# Patient Record
Sex: FEMALE | Race: WHITE | NOT HISPANIC OR LATINO | Employment: UNEMPLOYED | URBAN - METROPOLITAN AREA
[De-identification: names, ages, dates, MRNs, and addresses within clinical notes are randomized per-mention and may not be internally consistent; named-entity substitution may affect disease eponyms.]

---

## 2023-09-18 LAB
ERYTHROCYTE DISTRIBUTION WIDTH (RATIO) BY AUTOMATED COUNT: 13.4 % (ref 11.5–14.5)
ERYTHROCYTE MEAN CORPUSCULAR HEMOGLOBIN CONCENTRATION (G/DL) BY AUTOMATED: 32.3 G/DL (ref 32–36)
ERYTHROCYTE MEAN CORPUSCULAR VOLUME (FL) BY AUTOMATED COUNT: 86 FL (ref 80–100)
ERYTHROCYTES (10*6/UL) IN BLOOD BY AUTOMATED COUNT: 4.66 X10E12/L (ref 4–5.2)
HEMATOCRIT (%) IN BLOOD BY AUTOMATED COUNT: 40.2 % (ref 36–46)
HEMOGLOBIN (G/DL) IN BLOOD: 13 G/DL (ref 12–16)
LEUKOCYTES (10*3/UL) IN BLOOD BY AUTOMATED COUNT: 11.2 X10E9/L (ref 4.4–11.3)
PLATELETS (10*3/UL) IN BLOOD AUTOMATED COUNT: 261 X10E9/L (ref 150–450)
REFLEX ADDED, ANEMIA PANEL: NORMAL

## 2023-09-19 LAB
ABO GROUP (TYPE) IN BLOOD: NORMAL
ANTIBODY SCREEN: NORMAL
HEPATITIS B VIRUS SURFACE AG PRESENCE IN SERUM: NONREACTIVE
HEPATITIS C VIRUS AB PRESENCE IN SERUM: NONREACTIVE
HIV 1/ 2 AG/AB SCREEN: NONREACTIVE
RH FACTOR: NORMAL
RUBELLA VIRUS IGG AB: POSITIVE
SYPHILIS TOTAL AB: NONREACTIVE

## 2023-10-03 PROBLEM — J32.9 CHRONIC SINUSITIS: Status: ACTIVE | Noted: 2023-10-03

## 2023-10-03 PROBLEM — N92.6 MISSED PERIOD: Status: ACTIVE | Noted: 2023-10-03

## 2023-10-03 PROBLEM — R10.2 PELVIC AND PERINEAL PAIN: Status: ACTIVE | Noted: 2023-10-03

## 2023-10-03 PROBLEM — Z87.42 HISTORY OF OVARIAN CYST: Status: ACTIVE | Noted: 2023-10-03

## 2023-10-03 PROBLEM — G43.909 MIGRAINE HEADACHE: Status: ACTIVE | Noted: 2023-10-03

## 2023-10-03 PROBLEM — Z34.92 SECOND TRIMESTER PREGNANCY (HHS-HCC): Status: ACTIVE | Noted: 2023-10-03

## 2023-10-03 RX ORDER — ACETAMINOPHEN 325 MG/1
3 TABLET ORAL EVERY 6 HOURS
Status: ON HOLD | COMMUNITY
Start: 2022-07-07 | End: 2024-04-23 | Stop reason: ALTCHOICE

## 2023-10-03 RX ORDER — IBUPROFEN 600 MG/1
600 TABLET ORAL EVERY 6 HOURS
COMMUNITY
Start: 2022-07-07 | End: 2023-10-05 | Stop reason: WASHOUT

## 2023-10-05 ENCOUNTER — INITIAL PRENATAL (OUTPATIENT)
Dept: OBSTETRICS AND GYNECOLOGY | Facility: CLINIC | Age: 28
End: 2023-10-05
Payer: COMMERCIAL

## 2023-10-05 VITALS — BODY MASS INDEX: 21.9 KG/M2 | DIASTOLIC BLOOD PRESSURE: 60 MMHG | WEIGHT: 144 LBS | SYSTOLIC BLOOD PRESSURE: 106 MMHG

## 2023-10-05 DIAGNOSIS — Z11.3 ROUTINE SCREENING FOR STI (SEXUALLY TRANSMITTED INFECTION): ICD-10-CM

## 2023-10-05 DIAGNOSIS — O21.9 NAUSEA AND VOMITING IN PREGNANCY PRIOR TO 22 WEEKS GESTATION (HHS-HCC): ICD-10-CM

## 2023-10-05 DIAGNOSIS — Z98.891 HISTORY OF CESAREAN SECTION: ICD-10-CM

## 2023-10-05 DIAGNOSIS — O46.8X1 SUBCHORIONIC HEMORRHAGE OF PLACENTA IN FIRST TRIMESTER, FETUS 1 OF MULTIPLE GESTATION: ICD-10-CM

## 2023-10-05 DIAGNOSIS — O41.8X11 SUBCHORIONIC HEMORRHAGE OF PLACENTA IN FIRST TRIMESTER, FETUS 1 OF MULTIPLE GESTATION: ICD-10-CM

## 2023-10-05 DIAGNOSIS — O09.299 HISTORY OF MACROSOMIA IN INFANT IN PRIOR PREGNANCY, CURRENTLY PREGNANT (HHS-HCC): ICD-10-CM

## 2023-10-05 DIAGNOSIS — Z34.81 PRENATAL CARE, SUBSEQUENT PREGNANCY, FIRST TRIMESTER (HHS-HCC): Primary | ICD-10-CM

## 2023-10-05 DIAGNOSIS — Z12.4 CERVICAL CANCER SCREENING: ICD-10-CM

## 2023-10-05 PROCEDURE — 0500F INITIAL PRENATAL CARE VISIT: CPT | Performed by: OBSTETRICS & GYNECOLOGY

## 2023-10-05 PROCEDURE — 87624 HPV HI-RISK TYP POOLED RSLT: CPT

## 2023-10-05 PROCEDURE — 88175 CYTOPATH C/V AUTO FLUID REDO: CPT

## 2023-10-05 PROCEDURE — 87086 URINE CULTURE/COLONY COUNT: CPT | Mod: CMCLAB,GEALAB | Performed by: OBSTETRICS & GYNECOLOGY

## 2023-10-05 PROCEDURE — 87800 DETECT AGNT MULT DNA DIREC: CPT

## 2023-10-05 PROCEDURE — 88141 CYTOPATH C/V INTERPRET: CPT | Performed by: PATHOLOGY

## 2023-10-05 NOTE — PROGRESS NOTES
Subjective   Patient ID [id number]   Che Mckeon is a 28 y.o.  at 10w5d with a working estimated date of delivery of 2024, by Last Menstrual Period who presents for an initial prenatal visit. This pregnancy is planned.    Her pregnancy is complicated by:  History LTCS  Subchorionic bleed  History LGA infant  Nausea/vomiting    OB History    Para Term  AB Living   2 1 1 0 0 1   SAB IAB Ectopic Multiple Live Births   0 0 0 0 1      # Outcome Date GA Lbr Yuri/2nd Weight Sex Delivery Anes PTL Lv   2 Current            1 Term 22   4082 g F CS-Unspec Spinal N MICHAEL      Complications: Breech birth          Objective   Physical Exam  Weight: 65.3 kg (144 lb)  Expected Total Weight Gain: Could not be calculated   Pregravid BMI: Could not be calculated  BP: 106/60    Fetal Heart Rate: +     Physical Exam  Constitutional:       Appearance: Normal appearance.   Genitourinary:      Genitourinary Comments: Vulva normal in appearance without discoloration, rashes, lesions. Vagina is negative for blood, discharge, lesions. Uterus is small, mobile, non tender. There is no cervical motion tenderness, adnexal masses/tenderness.    Cardiovascular:      Rate and Rhythm: Normal rate and regular rhythm.   Pulmonary:      Effort: Pulmonary effort is normal.      Breath sounds: Normal breath sounds.   Abdominal:      General: Abdomen is flat.      Palpations: Abdomen is soft.      Tenderness: There is no abdominal tenderness.   Musculoskeletal:         General: Normal range of motion.      Cervical back: Normal range of motion.   Neurological:      General: No focal deficit present.   Skin:     General: Skin is warm and dry.   Psychiatric:         Mood and Affect: Mood normal.   Vitals and nursing note reviewed.         Prenatal Labs  PNL wnl    Assessment/Plan   Problem List Items Addressed This Visit    None  Visit Diagnoses         Codes    Prenatal care, subsequent pregnancy, first trimester    -  Primary  Z34.81    PNL reviewed/wnl  PNV  Advised TDap, Flu, COVID, RSV vaccines     Relevant Orders    THINPREP PAP    Urine Culture    History of  section     Z98.891    MFMU score calculated and 85% likelihood of success  Discussed options including TOLAC/RLTCS and risks/benefits of each     History of macrosomia in infant in prior pregnancy, currently pregnant     O09.299    G1 was 9 lb infant  Consider growth US at 36 weeks     Subchorionic hemorrhage of placenta in first trimester, fetus 1 of multiple gestation     O41.8X11, O46.8X1    Noted at [facility]. Patient Rh+, Rhogam not indicated  Precautions given     Nausea and vomiting in pregnancy prior to 22 weeks gestation     O21.9    Mild in first trimester. Discussed Diclegis/Bonjesta. Patient considering     Cervical cancer screening     Z12.4    Pap done at [facility]     Routine screening for STI (sexually transmitted infection)     Z11.3    NG/CT done at [facility]             Immunizations: Discussed Tdap, RSV, COVID, Flu  Prenatal Labs wnl  Daily prenatal vitamins prescribed  First trimester screening and second trimester screening discussed. Patient decided to hold at this time  Follow up in 4 weeks for return OB visit.  Kerrie Marshall DO

## 2023-10-05 NOTE — PATIENT INSTRUCTIONS
Welcome to prenatal care with [facility]!  You were seen in the office today for your initiation to prenatal care and had normal findings on exam  Continue routine OB precautions at home  Your labs were reviewed today and were normal. Routine pelvic cultures, urine culture, and pap smear (if you were due) were done today and you will be notified of the results  If you have had genetic screening labs done, we usually receive the results from [facility] within 7-9 business days and we will call you with the results.   Continue taking prenatal vitamins   Avoid sick contacts and consider getting your Flu (available in office during flu season) and COVID vaccines to protect against infection in pregnancy    What to expect:  -    You will see each of our physicians at least once during your prenatal care. There are 5 physicians (Kevin Sebastian, Blade, Aniket, Vale, and Dawit) and our NP Magnolia Cole. We rotate OB call to be able to provide the best care to our patients during this important time, and we want to make sure you have had a chance to meet each physician prior to coming in for labor.   -    We will see you in the office every 4 weeks in the first two trimesters, every 2 weeks between 30 and 36 weeks gestation, and weekly following 36 weeks. Anatomy ultrasounds are done at [facility] around 20 weeks, gestational diabetes and anemia screening is done through outpatient labs between 25 and 28 weeks gestation, and labor and delivery preparations (ultrasound to confirm presentation, consent forms, etc) are done at 36 weeks in the office.   -    Visits can seem quick, but provide us with quite a bit of important information. So it is important to try not to miss any visits if possible and make up any cancelled appointments as soon as possible. Make sure you come to each visit with a full bladder because urine testing for bacteria, glucose, and protein are done at each visit. And although the OB visits may seem quick,  we are happy to take the time to answer any questions or discuss any concerns you may have  -    We deliver at [facility]: [address] [place], [address]  -    Birth, lactation, and parenting classes, as well as scheduling for hospital tours can be done through [email or web address].       Make an appointment for routine care in the office in the next 4 weeks  If you are having any bleeding, pain, severe nausea and/or vomiting, or any other concerns prior to your next visit, please call the office to speak to the physician on call. This includes after hours, weekends, and holidays, when the answering service will be able to connect you with the physician on call. [phone] ([place] Office) or [phone] ([place] Office).    Congratulations, we are excited to partner with you in the care of your pregnancy!

## 2023-10-07 LAB — BACTERIA UR CULT: NORMAL

## 2023-10-10 LAB
C TRACH RRNA SPEC QL NAA+PROBE: NEGATIVE
N GONORRHOEA DNA SPEC QL PROBE+SIG AMP: NEGATIVE

## 2023-10-27 LAB
CYTOLOGY CMNT CVX/VAG CYTO-IMP: NORMAL
HPV HR GENOTYPES PNL CVX NAA+PROBE: NEGATIVE
HPV HR GENOTYPES PNL CVX NAA+PROBE: NEGATIVE
HPV16 DNA SPEC QL NAA+PROBE: NEGATIVE
HPV18 DNA SPEC QL NAA+PROBE: NEGATIVE
LAB AP HPV GENOTYPE QUESTION: YES
LAB AP HPV HR: NORMAL
LAB AP PAP ADDITIONAL TESTS: NORMAL
LABORATORY COMMENT REPORT: NORMAL
LMP START DATE: NORMAL
MENSTRUAL HX REPORTED: NORMAL
PATH REPORT.TOTAL CANCER: NORMAL

## 2023-11-02 ENCOUNTER — ROUTINE PRENATAL (OUTPATIENT)
Dept: OBSTETRICS AND GYNECOLOGY | Facility: CLINIC | Age: 28
End: 2023-11-02
Payer: COMMERCIAL

## 2023-11-02 VITALS — WEIGHT: 153 LBS | BODY MASS INDEX: 23.26 KG/M2 | DIASTOLIC BLOOD PRESSURE: 76 MMHG | SYSTOLIC BLOOD PRESSURE: 128 MMHG

## 2023-11-02 DIAGNOSIS — Z34.82 ENCOUNTER FOR SUPERVISION OF OTHER NORMAL PREGNANCY IN SECOND TRIMESTER (HHS-HCC): Primary | ICD-10-CM

## 2023-11-02 DIAGNOSIS — O35.9XX1 SUSPECTED FETAL ANOMALY, ANTEPARTUM, FETUS 1 OF MULTIPLE GESTATION (HHS-HCC): ICD-10-CM

## 2023-11-02 LAB
POC APPEARANCE, URINE: CLEAR
POC BILIRUBIN, URINE: NEGATIVE
POC BLOOD, URINE: NEGATIVE
POC COLOR, URINE: YELLOW
POC GLUCOSE, URINE: NEGATIVE MG/DL
POC KETONES, URINE: NEGATIVE MG/DL
POC LEUKOCYTES, URINE: NEGATIVE
POC NITRITE,URINE: NEGATIVE
POC PH, URINE: 6 PH
POC PROTEIN, URINE: NEGATIVE MG/DL
POC SPECIFIC GRAVITY, URINE: 1.01
POC UROBILINOGEN, URINE: 0.2 EU/DL

## 2023-11-02 PROCEDURE — 81003 URINALYSIS AUTO W/O SCOPE: CPT | Performed by: OBSTETRICS & GYNECOLOGY

## 2023-11-02 PROCEDURE — 0501F PRENATAL FLOW SHEET: CPT | Performed by: OBSTETRICS & GYNECOLOGY

## 2023-11-02 NOTE — PROGRESS NOTES
Subjective   Che Mckeon is a 28 y.o.  at 14w5d, presents for a routine prenatal visit.     Objective     /76   Wt 69.4 kg (153 lb)   LMP 2023 (Exact Date)   BMI 23.26 kg/m²     Informal transabd ultrasound shows viable single IUP.   + FCA.     Plan    14 wks   Declines flu vaccine.  Anatomy ultrasound requisition.

## 2023-11-29 NOTE — PROGRESS NOTES
Subjective   Patient ID [id number]   Che Mckeon is a 28 y.o.  at 18w4d ,+FM. Has anatomy scan scheduled Friday the .    Objective   Physical Exam  Weight: 70.8 kg (156 lb)  BP: 114/60  Fetal Heart Rate: 150 Fundal Height (cm): 18 cm    Lab Results   Component Value Date    HGB 13.0 2023    HCT 40.2 2023       Assessment/Plan   Anatomy scan on the .  Declines Flu/Covid vaccines.  Follow up in 4 weeks for a routine prenatal visit.

## 2023-11-30 ENCOUNTER — ROUTINE PRENATAL (OUTPATIENT)
Dept: OBSTETRICS AND GYNECOLOGY | Facility: CLINIC | Age: 28
End: 2023-11-30
Payer: COMMERCIAL

## 2023-11-30 VITALS — BODY MASS INDEX: 23.72 KG/M2 | WEIGHT: 156 LBS | SYSTOLIC BLOOD PRESSURE: 114 MMHG | DIASTOLIC BLOOD PRESSURE: 60 MMHG

## 2023-11-30 DIAGNOSIS — Z3A.18 18 WEEKS GESTATION OF PREGNANCY (HHS-HCC): ICD-10-CM

## 2023-11-30 LAB
POC GLUCOSE, URINE: NEGATIVE MG/DL
POC PROTEIN, URINE: ABNORMAL MG/DL

## 2023-11-30 PROCEDURE — 0501F PRENATAL FLOW SHEET: CPT | Performed by: OBSTETRICS & GYNECOLOGY

## 2023-12-08 ENCOUNTER — ANCILLARY PROCEDURE (OUTPATIENT)
Dept: RADIOLOGY | Facility: CLINIC | Age: 28
End: 2023-12-08
Payer: COMMERCIAL

## 2023-12-08 DIAGNOSIS — Z34.90 PREGNANT (HHS-HCC): ICD-10-CM

## 2023-12-08 PROCEDURE — 76805 OB US >/= 14 WKS SNGL FETUS: CPT | Performed by: OBSTETRICS & GYNECOLOGY

## 2023-12-08 PROCEDURE — 76805 OB US >/= 14 WKS SNGL FETUS: CPT

## 2023-12-29 ENCOUNTER — APPOINTMENT (OUTPATIENT)
Dept: OBSTETRICS AND GYNECOLOGY | Facility: CLINIC | Age: 28
End: 2023-12-29
Payer: COMMERCIAL

## 2024-01-05 ENCOUNTER — APPOINTMENT (OUTPATIENT)
Dept: OBSTETRICS AND GYNECOLOGY | Facility: CLINIC | Age: 29
End: 2024-01-05
Payer: COMMERCIAL

## 2024-01-17 NOTE — PROGRESS NOTES
Subjective   Patient ID [id number]   Che Mckeon is a 28 y.o.  at 25w5d +FM. Last seen  had to reschedule and was sick. Having pressure, no ctxs.    Objective   Physical Exam  Weight: 75.3 kg (166 lb)  BP: 104/60  Fetal Heart Rate: 140 Fundal Height (cm): 26 cm    Lab Results   Component Value Date    HGB 13.0 2023    HCT 40.2 2023       Assessment/Plan   Glucola given Will do in the next couple weeks.  Follow up in 4 weeks for a routine prenatal visit.

## 2024-01-18 ENCOUNTER — ROUTINE PRENATAL (OUTPATIENT)
Dept: OBSTETRICS AND GYNECOLOGY | Facility: CLINIC | Age: 29
End: 2024-01-18
Payer: COMMERCIAL

## 2024-01-18 VITALS — SYSTOLIC BLOOD PRESSURE: 104 MMHG | WEIGHT: 166 LBS | DIASTOLIC BLOOD PRESSURE: 60 MMHG | BODY MASS INDEX: 25.24 KG/M2

## 2024-01-18 DIAGNOSIS — Z3A.25 25 WEEKS GESTATION OF PREGNANCY (HHS-HCC): ICD-10-CM

## 2024-01-18 LAB
POC GLUCOSE, URINE: NEGATIVE MG/DL
POC PROTEIN, URINE: NEGATIVE MG/DL

## 2024-01-18 PROCEDURE — 0501F PRENATAL FLOW SHEET: CPT | Performed by: OBSTETRICS & GYNECOLOGY

## 2024-01-18 PROCEDURE — 81003 URINALYSIS AUTO W/O SCOPE: CPT | Performed by: OBSTETRICS & GYNECOLOGY

## 2024-01-18 NOTE — PATIENT INSTRUCTIONS
You were seen in the office today for routine OB care   Continue routine OB precautions at home  Continue taking prenatal vitamins   Avoid sick contacts and consider getting your Flu (available in office during flu season) and COVID vaccines to protect against infection in pregnancy  You will be given an order for your anatomy ultrasound. Please schedule at [facility] between 19 and 22 weeks gestation [phone]  You will be given a bottle of Glucola and orders for your second trimester labs. Please complete these between 25 and 28 weeks gestation. You may complete these at any  outpatient lab, and do not need an appointment  Make an appointment for routine care in the office in the next 4 weeks  If you are having any concerns prior to your next visit please call the office to speak to the physician on call. This includes after hours, weekends, and holidays, when the answering service will be able to connect you with the physician on call. [phone] ([place] Office) or [phone] [place] [facility].

## 2024-01-30 ENCOUNTER — LAB (OUTPATIENT)
Dept: LAB | Facility: LAB | Age: 29
End: 2024-01-30
Payer: COMMERCIAL

## 2024-01-30 DIAGNOSIS — Z3A.25 25 WEEKS GESTATION OF PREGNANCY (HHS-HCC): ICD-10-CM

## 2024-01-30 LAB
ERYTHROCYTE [DISTWIDTH] IN BLOOD BY AUTOMATED COUNT: 13 % (ref 11.5–14.5)
GLUCOSE 1H P 50 G GLC PO SERPL-MCNC: 108 MG/DL
HCT VFR BLD AUTO: 36.5 % (ref 36–46)
HGB BLD-MCNC: 11.7 G/DL (ref 12–16)
MCH RBC QN AUTO: 26.9 PG (ref 26–34)
MCHC RBC AUTO-ENTMCNC: 32.1 G/DL (ref 32–36)
MCV RBC AUTO: 84 FL (ref 80–100)
NRBC BLD-RTO: 0 /100 WBCS (ref 0–0)
PLATELET # BLD AUTO: 255 X10*3/UL (ref 150–450)
RBC # BLD AUTO: 4.35 X10*6/UL (ref 4–5.2)
WBC # BLD AUTO: 8.2 X10*3/UL (ref 4.4–11.3)

## 2024-01-30 PROCEDURE — 36415 COLL VENOUS BLD VENIPUNCTURE: CPT

## 2024-01-31 LAB — REFLEX ADDED, ANEMIA PANEL: NORMAL

## 2024-02-15 ENCOUNTER — APPOINTMENT (OUTPATIENT)
Dept: OBSTETRICS AND GYNECOLOGY | Facility: CLINIC | Age: 29
End: 2024-02-15
Payer: COMMERCIAL

## 2024-02-15 ENCOUNTER — ROUTINE PRENATAL (OUTPATIENT)
Dept: OBSTETRICS AND GYNECOLOGY | Facility: CLINIC | Age: 29
End: 2024-02-15
Payer: COMMERCIAL

## 2024-02-15 VITALS — SYSTOLIC BLOOD PRESSURE: 110 MMHG | WEIGHT: 170 LBS | DIASTOLIC BLOOD PRESSURE: 60 MMHG | BODY MASS INDEX: 25.85 KG/M2

## 2024-02-15 DIAGNOSIS — Z3A.29 29 WEEKS GESTATION OF PREGNANCY (HHS-HCC): ICD-10-CM

## 2024-02-15 DIAGNOSIS — Z34.83 MULTIGRAVIDA IN THIRD TRIMESTER (HHS-HCC): Primary | ICD-10-CM

## 2024-02-15 LAB
POC GLUCOSE, URINE: NEGATIVE MG/DL
POC PROTEIN, URINE: NEGATIVE MG/DL

## 2024-02-15 PROCEDURE — 0501F PRENATAL FLOW SHEET: CPT | Performed by: OBSTETRICS & GYNECOLOGY

## 2024-02-15 NOTE — PATIENT INSTRUCTIONS
You were seen in the office today for routine OB care and had normal findings on exam  Continue routine OB precautions at home  Continue taking prenatal vitamins   Avoid sick contacts and consider getting your Flu (available in office during flu season) and COVID vaccines to protect against infection in pregnancy  We recommend getting the Tdap (available in office) and RSV vaccines to pass some immunity from mother to baby and protect your baby against RSV and Whooping cough in the first few months of life. Tdap can be given between 27 and 36 weeks, and RSV vaccinations can be given between 32 and 36 weeks gestation  Make an appointment for routine care in the office in the next 2 weeks  If you are having any painful contractions, vaginal bleeding, leaking amniotic fluid, or decrease in baby's movements prior to your next visit please call the office to speak to the physician on call. This includes after hours, weekends, and holidays, when the answering service will be able to connect you with the physician on call. [phone] ([place] Office) or [phone] ([place] Office).    RSV Vaccination in Pregnancy:  ACOG recommends the Pfizer RSV vaccine if you are 32 to 36 weeks pregnant from September to January. The vaccines creates  antibodies (immune proteins) that pass from you to your fetus. This means the baby will have some antibodies to protect them from RSV for the first 6 months after birth  RSV, or respiratory syncytial virus is a virus that spreads in the fall and winter. RSV can be dangerous for babies and young children. It is the leading cause of hospitalization among infants in the [place].  There are multiple RSV vaccines approved by the U.S. Food and Drug Administration (FDA). The only RSV vaccine approved by the FDA for use in pregnancy is the one made by Pfizer. It is called Abrysvo.  You can get the Pfizer RSV vaccine at the same time as other vaccines recommended in pregnancy (COVID,  Flu, Tdap). Common side effects of the RSV vaccine include arm pain, headache, muscle pain, and nausea, similar to other vaccines side effects. Side effects are normal and not a cause for concern.   The RSV vaccine is one of two options for protecting babies during RSV season. There is also an option to give babies an injection called nirsevimab. Nirsevimab contains lab-made antibodies that protect against RSV. It is not a vaccine.   In most cases, you should choose between the RSV vaccine during pregnancy nirsevimab after birth (not both). The goal is to protect your baby from RSV, either with antibodies made during pregnancy or with antibodies given directly to your baby after birth.  Benefits to getting the RSV vaccine during pregnancy include: RSV vaccine in pregnancy gives your baby protection right after birth, decreasing the number of injections given to your baby after birth, potential difficulty getting nirsevimab in he fall/winter due to less availability during busy vaccination seasons. Benefits to nirsevimab include possible longer lasting protection.   We fully support the recommendations by ACOG regarding the RSV vaccine in pregnancy. We are unable to offer this vaccine in the office at this time. You should be able to get this vaccine through your primary care physician, pharmacies, and minute clinics. Please make sure you are receiving the Pfizer vaccine, as it is the only version to be FDA approved for use in pregnancy. Please let us know if you have had this vaccine so we can include it in your chart.     Tdap Vaccination in Pregnancy:  Pertussis, also known as whooping cough, is a highly contagious disease that causes severe coughing and difficulty breathing. Although pertussis can affect people at any age, infants younger than age 3 months have the highest risk of severe disease and dying.  The Tdap vaccine is used to prevent tetanus, diphtheria, and pertussis. It is recommended all pregnant  women should get a Tdap vaccine between 27 and 36 weeks of pregnancy, as early in the window as possible. It is recommended that the Tdap vaccine be given in every pregnancy, so you should get the Tdap during the recommended window, even if you received the vaccine in a previous pregnancy  The Tdap vaccine helps the human body make protective antibodies against pertussis. These antibodies are then passed through the placenta from mother to baby, and help protect your baby from pertussis until they are eligible to start being vaccinated at 2 months of age.  Getting the Tdap vaccine during pregnancy is the most important step in early prevention against pertussis. It is also important for all family members and caregivers are up to date with their vaccines  The Tdap vaccine can also be given safely to breastfeeding women if they did not receive the vaccine during pregnancy. In addition to preventing you from being infected with pertussis and potentially passing to your baby, there may be some added immunity benefit to the baby   If you received the Tdap vaccine in your pregnancy prior to 27 to 36 weeks gestation, you do not need to be vaccinated a second time in the same pregnancy  The Tdap vaccine can be given at the same time as other recommended vaccines including the COVID, Flu vaccine, and RSV vaccines  We encourage all of our pregnant patients to get the Tdap vaccine. The Tdap vaccine is available in the office. If you would like to be given the Tdap vaccine at your visit, please let the medical assistant know.

## 2024-02-15 NOTE — PROGRESS NOTES
Subjective   Patient ID [id number]   Che Mckeon is a 28 y.o.  at 29w5d with a working estimated date of delivery of 2024, by Last Menstrual Period who presents for a routine prenatal visit. She denies vaginal bleeding, leakage of fluid, decreased fetal movements, or contractions.    Her pregnancy is complicated by:  Uncomplicated      Objective   Physical Exam:   Weight: 77.1 kg (170 lb)  Expected Total Weight Gain: Could not be calculated   Pregravid BMI: Could not be calculated  BP: 110/60                  Prenatal Labs  Urine Dip:  Lab Results   Component Value Date    KETONESU NEGATIVE 2023     Lab Results   Component Value Date    HGB 11.7 (L) 2024    HCT 36.5 2024    ABO B 2023    HEPBSAG NONREACTIVE 2023           Imagin weeks for presentation    Assessment/Plan   Problem List Items Addressed This Visit    None  Visit Diagnoses         Codes    Multigravida in third trimester    -  Primary Z34.83    Patient doing well  Advised RSV/TDap   Precautions given  RTO 2 weeks     29 weeks gestation of pregnancy     Z3A.29    Relevant Orders    POCT UA Automated manually resulted (Completed)          Continue prenatal vitamin.  Labs reviewed.  GBS 36 weeks   Expected mode of delivery desires TOLAC  Follow up in 1 week for a routine prenatal visit.  Kerrie Marshall,

## 2024-03-06 ENCOUNTER — ROUTINE PRENATAL (OUTPATIENT)
Dept: OBSTETRICS AND GYNECOLOGY | Facility: CLINIC | Age: 29
End: 2024-03-06
Payer: COMMERCIAL

## 2024-03-06 VITALS — SYSTOLIC BLOOD PRESSURE: 120 MMHG | BODY MASS INDEX: 26.61 KG/M2 | DIASTOLIC BLOOD PRESSURE: 60 MMHG | WEIGHT: 175 LBS

## 2024-03-06 DIAGNOSIS — Z3A.32 32 WEEKS GESTATION OF PREGNANCY (HHS-HCC): ICD-10-CM

## 2024-03-06 DIAGNOSIS — Z34.83 PRENATAL CARE, SUBSEQUENT PREGNANCY, THIRD TRIMESTER (HHS-HCC): Primary | ICD-10-CM

## 2024-03-06 LAB
POC GLUCOSE, URINE: NEGATIVE MG/DL
POC PROTEIN, URINE: NEGATIVE MG/DL

## 2024-03-06 PROCEDURE — 0501F PRENATAL FLOW SHEET: CPT | Performed by: OBSTETRICS & GYNECOLOGY

## 2024-03-06 NOTE — PROGRESS NOTES
Prenatal Visit    Patient presents for routine prenatal visit.   Feeling well.  Baby is moving. Occasional tightening. No bleeding. No leaking fluid.    Objective   Physical Exam:   Weight: 79.4 kg (175 lb)  Expected Total Weight Gain: Could not be calculated   Pregravid BMI: Could not be calculated  BP: 120/60  Fetal Heart Rate: 130 Fundal Height (cm): 32 cm             Assessment/Plan   1. 32 weeks gestation of pregnancy  - POCT UA Automated manually resulted  2. Prenatal care, subsequent pregnancy, third trimester    Doing well today, normal exam.  Continue prenatal vitamins.  Advised her to call for any concerns.  Follow up in 2 weeks.

## 2024-03-20 ENCOUNTER — ROUTINE PRENATAL (OUTPATIENT)
Dept: OBSTETRICS AND GYNECOLOGY | Facility: CLINIC | Age: 29
End: 2024-03-20
Payer: COMMERCIAL

## 2024-03-20 VITALS — SYSTOLIC BLOOD PRESSURE: 100 MMHG | DIASTOLIC BLOOD PRESSURE: 62 MMHG | BODY MASS INDEX: 26.76 KG/M2 | WEIGHT: 176 LBS

## 2024-03-20 DIAGNOSIS — Z34.83 PRENATAL CARE, SUBSEQUENT PREGNANCY, THIRD TRIMESTER (HHS-HCC): Primary | ICD-10-CM

## 2024-03-20 DIAGNOSIS — Z3A.34 34 WEEKS GESTATION OF PREGNANCY (HHS-HCC): ICD-10-CM

## 2024-03-20 LAB
POC APPEARANCE, URINE: CLEAR
POC BILIRUBIN, URINE: NEGATIVE
POC BLOOD, URINE: NEGATIVE
POC COLOR, URINE: YELLOW
POC GLUCOSE, URINE: NEGATIVE MG/DL
POC KETONES, URINE: NEGATIVE MG/DL
POC LEUKOCYTES, URINE: NEGATIVE
POC NITRITE,URINE: NEGATIVE
POC PH, URINE: 6.5 PH
POC PROTEIN, URINE: NEGATIVE MG/DL
POC SPECIFIC GRAVITY, URINE: 1.01
POC UROBILINOGEN, URINE: 0.2 EU/DL

## 2024-03-20 PROCEDURE — 0501F PRENATAL FLOW SHEET: CPT | Performed by: OBSTETRICS & GYNECOLOGY

## 2024-03-20 NOTE — PROGRESS NOTES
Subjective   Che Mckeon is a 28 y.o.  at 34w4d, presents for a routine prenatal visit.   Pt with pelvic pressure, discomfort in the groin.  Previous pregnancy LTCS for breech.   Pt wants a TOLAC. Is anxious to know if baby is head down.     Objective     /62   Wt 79.8 kg (176 lb)   LMP 2023 (Exact Date)   BMI 26.76 kg/m²   Informal ultrasound Vtx, actively moving fetus, nl AFV.    Plan    34 4/7 weeks  RTO 2 weeks, GBS next visit.   We discussed induction at 39 weeks if cervix favorable. Previous baby 9#, c/s breech.     María Sebastian MD

## 2024-04-04 ENCOUNTER — ROUTINE PRENATAL (OUTPATIENT)
Dept: OBSTETRICS AND GYNECOLOGY | Facility: CLINIC | Age: 29
End: 2024-04-04
Payer: COMMERCIAL

## 2024-04-04 VITALS — BODY MASS INDEX: 27.52 KG/M2 | SYSTOLIC BLOOD PRESSURE: 122 MMHG | WEIGHT: 181 LBS | DIASTOLIC BLOOD PRESSURE: 82 MMHG

## 2024-04-04 DIAGNOSIS — Z34.83 MULTIGRAVIDA IN THIRD TRIMESTER (HHS-HCC): Primary | ICD-10-CM

## 2024-04-04 DIAGNOSIS — Z3A.36 36 WEEKS GESTATION OF PREGNANCY (HHS-HCC): ICD-10-CM

## 2024-04-04 LAB
POC APPEARANCE, URINE: CLEAR
POC BILIRUBIN, URINE: NEGATIVE
POC BLOOD, URINE: NEGATIVE
POC COLOR, URINE: YELLOW
POC GLUCOSE, URINE: NEGATIVE MG/DL
POC KETONES, URINE: NEGATIVE MG/DL
POC LEUKOCYTES, URINE: NEGATIVE
POC NITRITE,URINE: NEGATIVE
POC PH, URINE: 5 PH
POC PROTEIN, URINE: NEGATIVE MG/DL
POC SPECIFIC GRAVITY, URINE: 1.01
POC UROBILINOGEN, URINE: 0.2 EU/DL

## 2024-04-04 PROCEDURE — 87081 CULTURE SCREEN ONLY: CPT

## 2024-04-04 PROCEDURE — 0501F PRENATAL FLOW SHEET: CPT | Performed by: OBSTETRICS & GYNECOLOGY

## 2024-04-04 NOTE — PROGRESS NOTES
Subjective   Patient ID [id number]   Che Mckeon is a 28 y.o.  at 36w5d with a working estimated date of delivery of 2024, by Last Menstrual Period who presents for a routine prenatal visit. She denies vaginal bleeding, leakage of fluid, decreased fetal movements, or contractions.    Her pregnancy is complicated by:  LTCS x 1  Hx LGA    Objective   Physical Exam:   Weight: 82.1 kg (181 lb)  Expected Total Weight Gain: Could not be calculated   Pregravid BMI: Could not be calculated  BP: 122/82                  Prenatal Labs  Urine Dip:  Lab Results   Component Value Date    KETONESU NEGATIVE 2024     Lab Results   Component Value Date    HGB 11.7 (L) 2024    HCT 36.5 2024    ABO B 2023    HEPBSAG NONREACTIVE 2023           Imaging: vertex       Assessment/Plan   Problem List Items Addressed This Visit    None  Visit Diagnoses         Codes    Multigravida in third trimester    -  Primary Z34.83    Patient doing well  Consents signed, GBS done  US confirms vertex  RTO 1 week     Relevant Orders    Group B Streptococcus (GBS) Prenatal Screen, Culture    36 weeks gestation of pregnancy     Z3A.36    Relevant Orders    POCT UA (nonautomated) manually resulted (Completed)          Continue prenatal vitamin.  Labs reviewed.  GBS taken.  Expected mode of delivery TOLAC  Follow up in 1 week for a routine prenatal visit.  Kerrie Marshall, DO

## 2024-04-04 NOTE — PATIENT INSTRUCTIONS
You were seen in the office today for routine OB care and had normal findings on exam  Continue routine OB precautions at home  Continue taking prenatal vitamins   Avoid sick contacts and consider getting your Flu (available in office during flu season) and COVID vaccines to protect against infection in pregnancy  We recommend getting the Tdap (available in office) and RSV vaccines to pass some immunity from mother to baby and protect your baby against RSV and Whooping cough in the first few months of life. Tdap can be given between 27 and 36 weeks, and RSV vaccinations can be given between 32 and 36 weeks gestation  Make an appointment for routine care in the office in the next 2 weeks  If you are having any painful contractions, vaginal bleeding, leaking amniotic fluid, or decrease in baby's movements prior to your next visit please call the office to speak to the physician on call. This includes after hours, weekends, and holidays, when the answering service will be able to connect you with the physician on call. [phone] ([place] Office) or [phone] ([place] Office).  \

## 2024-04-07 LAB — GP B STREP GENITAL QL CULT: NORMAL

## 2024-04-11 ENCOUNTER — ROUTINE PRENATAL (OUTPATIENT)
Dept: OBSTETRICS AND GYNECOLOGY | Facility: CLINIC | Age: 29
End: 2024-04-11
Payer: COMMERCIAL

## 2024-04-11 VITALS — SYSTOLIC BLOOD PRESSURE: 100 MMHG | DIASTOLIC BLOOD PRESSURE: 60 MMHG | WEIGHT: 181.4 LBS | BODY MASS INDEX: 27.58 KG/M2

## 2024-04-11 DIAGNOSIS — Z34.83 PRENATAL CARE, SUBSEQUENT PREGNANCY, THIRD TRIMESTER (HHS-HCC): Primary | ICD-10-CM

## 2024-04-11 DIAGNOSIS — Z3A.37 37 WEEKS GESTATION OF PREGNANCY (HHS-HCC): ICD-10-CM

## 2024-04-11 LAB
POC GLUCOSE, URINE: NEGATIVE MG/DL
POC PROTEIN, URINE: ABNORMAL MG/DL
POC SPECIFIC GRAVITY, URINE: <=1.005

## 2024-04-11 PROCEDURE — 0501F PRENATAL FLOW SHEET: CPT | Performed by: OBSTETRICS & GYNECOLOGY

## 2024-04-11 NOTE — PROGRESS NOTES
Prenatal Visit    Patient presents for routine prenatal visit.   Feeling ok. Has had cold symptoms - congestion, sinus pressure for a few days.  Baby is moving. No abdominal pain. No bleeding. No leaking fluid.    Objective   Physical Exam:   Weight: 82.3 kg (181 lb 6.4 oz)  Expected Total Weight Gain: Could not be calculated   Pregravid BMI: Could not be calculated  BP: 100/60  Fetal Heart Rate: 135 Fundal Height (cm): 37 cm    Dilation: 2 Effacement (%): 60 Fetal Station: -3    Assessment/Plan   1. 37 weeks gestation of pregnancy  - POCT UA Automated manually resulted  2. Prenatal care, subsequent pregnancy, third trimester    Doing well today.  Continue prenatal vitamins  Precautions given. Advised her to call for any concerns or signs of labor.  Follow up in 1 week

## 2024-04-18 ENCOUNTER — ROUTINE PRENATAL (OUTPATIENT)
Dept: OBSTETRICS AND GYNECOLOGY | Facility: CLINIC | Age: 29
End: 2024-04-18
Payer: COMMERCIAL

## 2024-04-18 VITALS — BODY MASS INDEX: 27.98 KG/M2 | DIASTOLIC BLOOD PRESSURE: 62 MMHG | SYSTOLIC BLOOD PRESSURE: 100 MMHG | WEIGHT: 184 LBS

## 2024-04-18 DIAGNOSIS — Z3A.38 38 WEEKS GESTATION OF PREGNANCY (HHS-HCC): ICD-10-CM

## 2024-04-18 DIAGNOSIS — Z34.83 PRENATAL CARE, SUBSEQUENT PREGNANCY, THIRD TRIMESTER (HHS-HCC): Primary | ICD-10-CM

## 2024-04-18 LAB
POC GLUCOSE, URINE: NEGATIVE MG/DL
POC PROTEIN, URINE: ABNORMAL MG/DL

## 2024-04-18 PROCEDURE — 59426 ANTEPARTUM CARE ONLY: CPT | Performed by: OBSTETRICS & GYNECOLOGY

## 2024-04-18 RX ORDER — AZITHROMYCIN 250 MG/1
TABLET, FILM COATED ORAL
Status: ON HOLD | COMMUNITY
Start: 2024-04-15 | End: 2024-04-23 | Stop reason: ALTCHOICE

## 2024-04-18 RX ORDER — ALBUTEROL SULFATE 90 UG/1
AEROSOL, METERED RESPIRATORY (INHALATION)
Status: ON HOLD | COMMUNITY
Start: 2024-04-15 | End: 2024-04-23 | Stop reason: ALTCHOICE

## 2024-04-18 NOTE — PROGRESS NOTES
Prenatal Visit    Patient presents for routine prenatal visit.   Feeling ok. Cold still lingering but better.  Baby is moving. No new contractions or abdominal pain. No bleeding. No leaking fluid.    Objective   Physical Exam:   Weight: 83.5 kg (184 lb)  Expected Total Weight Gain: Could not be calculated   Pregravid BMI: Could not be calculated  BP: 100/62  Fetal Heart Rate: 140 Fundal Height (cm): 38 cm Presentation: Vertex  Dilation: 2 Effacement (%): 70 Fetal Station: -2    Assessment/Plan   1. Prenatal care, subsequent pregnancy, third trimester ([facility])  2. 38 weeks gestation of pregnancy ([facility])  - POCT UA Automated manually resulted    Would like to schedule induction for next week. Cervix is favorable. Will schedule Pitocin induction for 4/23/24 at 0730.  Continue prenatal vitamins  Precautions given. Advised her to call for any concerns or signs of labor.  Follow up in 1 weeks.

## 2024-04-22 ENCOUNTER — PREP FOR PROCEDURE (OUTPATIENT)
Dept: OBSTETRICS AND GYNECOLOGY | Facility: HOSPITAL | Age: 29
End: 2024-04-22
Payer: COMMERCIAL

## 2024-04-22 RX ORDER — OXYTOCIN 10 [USP'U]/ML
10 INJECTION, SOLUTION INTRAMUSCULAR; INTRAVENOUS ONCE AS NEEDED
OUTPATIENT
Start: 2024-04-22

## 2024-04-22 RX ORDER — METOCLOPRAMIDE HYDROCHLORIDE 5 MG/ML
10 INJECTION INTRAMUSCULAR; INTRAVENOUS EVERY 6 HOURS PRN
Status: CANCELLED | OUTPATIENT
Start: 2024-04-22

## 2024-04-22 RX ORDER — LIDOCAINE HYDROCHLORIDE 10 MG/ML
30 INJECTION INFILTRATION; PERINEURAL ONCE AS NEEDED
Status: CANCELLED | OUTPATIENT
Start: 2024-04-22

## 2024-04-22 RX ORDER — LOPERAMIDE HYDROCHLORIDE 2 MG/1
4 CAPSULE ORAL EVERY 2 HOUR PRN
OUTPATIENT
Start: 2024-04-22

## 2024-04-22 RX ORDER — METHYLERGONOVINE MALEATE 0.2 MG/ML
0.2 INJECTION INTRAVENOUS ONCE AS NEEDED
OUTPATIENT
Start: 2024-04-22

## 2024-04-22 RX ORDER — SODIUM CHLORIDE, SODIUM LACTATE, POTASSIUM CHLORIDE, CALCIUM CHLORIDE 600; 310; 30; 20 MG/100ML; MG/100ML; MG/100ML; MG/100ML
125 INJECTION, SOLUTION INTRAVENOUS CONTINUOUS
Status: CANCELLED | OUTPATIENT
Start: 2024-04-22

## 2024-04-22 RX ORDER — TERBUTALINE SULFATE 1 MG/ML
0.25 INJECTION SUBCUTANEOUS ONCE AS NEEDED
Status: CANCELLED | OUTPATIENT
Start: 2024-04-22

## 2024-04-22 RX ORDER — BUTORPHANOL TARTRATE 1 MG/ML
1 INJECTION INTRAMUSCULAR; INTRAVENOUS EVERY 10 MIN PRN
Status: CANCELLED | OUTPATIENT
Start: 2024-04-22

## 2024-04-22 RX ORDER — METOCLOPRAMIDE 5 MG/1
10 TABLET ORAL EVERY 6 HOURS PRN
Status: CANCELLED | OUTPATIENT
Start: 2024-04-22

## 2024-04-22 RX ORDER — ONDANSETRON HYDROCHLORIDE 2 MG/ML
4 INJECTION, SOLUTION INTRAVENOUS EVERY 6 HOURS PRN
Status: CANCELLED | OUTPATIENT
Start: 2024-04-22

## 2024-04-22 RX ORDER — NIFEDIPINE 10 MG/1
10 CAPSULE ORAL ONCE AS NEEDED
Status: CANCELLED | OUTPATIENT
Start: 2024-04-22

## 2024-04-22 RX ORDER — HYDRALAZINE HYDROCHLORIDE 20 MG/ML
5 INJECTION INTRAMUSCULAR; INTRAVENOUS ONCE AS NEEDED
Status: CANCELLED | OUTPATIENT
Start: 2024-04-22

## 2024-04-22 RX ORDER — CARBOPROST TROMETHAMINE 250 UG/ML
250 INJECTION, SOLUTION INTRAMUSCULAR ONCE AS NEEDED
OUTPATIENT
Start: 2024-04-22

## 2024-04-22 RX ORDER — MISOPROSTOL 100 UG/1
800 TABLET ORAL ONCE AS NEEDED
OUTPATIENT
Start: 2024-04-22

## 2024-04-22 RX ORDER — ONDANSETRON 4 MG/1
4 TABLET, FILM COATED ORAL EVERY 6 HOURS PRN
Status: CANCELLED | OUTPATIENT
Start: 2024-04-22

## 2024-04-22 RX ORDER — LABETALOL HYDROCHLORIDE 5 MG/ML
20 INJECTION, SOLUTION INTRAVENOUS ONCE AS NEEDED
Status: CANCELLED | OUTPATIENT
Start: 2024-04-22

## 2024-04-22 RX ORDER — TRANEXAMIC ACID 100 MG/ML
1000 INJECTION, SOLUTION INTRAVENOUS ONCE AS NEEDED
OUTPATIENT
Start: 2024-04-22

## 2024-04-22 NOTE — H&P
Obstetrical Admission History and Physical     Che Mckeon is a 28 y.o.  at 39w2d. HECTOR: 2024, by Last Menstrual Period. She has had prenatal care with [facility] .  Prenatal care has been uncomplicated.  She had a prior LTCS for breech. Desires trial of labor this pregnancy.  First baby 9 lbs.    Chief Complaint: induction of labor.    Assessment/Plan   29 yo  at 39.2 weeks presents for TOLAC induction.    Admit to L&D  Continuous fetal monitoring  Place IV, start IV Fluids  CBC, T&S ordered  Pitocin with AROM for induction.  GBS is negative.  Pain management as needed       Active Problems:  There are no active Hospital Problems.      Pregnancy Problems (from 10/05/23 to present)       No problems associated with this episode.          The previous  section has been discussed with the patient. Options for delivery of this pregnancy have been discussed, and the patient elects a trial of labor after . Trial of labor has been discussed in detail. The risks, benefits, complications, alternatives, expected outcomes, potential problems during recuperation and recovery, and the risks of not performing the procedure were discussed with the patient. The patient stated understanding that the risks of a trial of labor include, but are not limited to: death; reaction to medications; uterine rupture; injury to bowel, bladder, ureters, uterus, cervix, vagina, and other pelvic and abdominal structures; infection; blood loss and possible need for transfusion; and potential need for surgery, including hysterectomy. Additionally, the fetal risks are major, including death, with a uterine rupture. The possible need for  section in the future was explained. All questions were answered. There was concurrence with the planned procedure, and the patient wanted to proceed.    Admit to inpatient status. I anticipate that this patient will require a stay exceeding at least 2 midnights for delivery and  postpartum.  Proceed with trial of labor, as requested, with monitoring.  Management of pregnancy complications, as indicated.    Uriah Bolton has had 0 previous vaginal delivery(ies) and 1 previous  section(s).   The patient is requesting a trial of labor for vaginal delivery after  birth. A review of her previous  section(s) and her previous obstetrical and gynecological history indicates that she is a good candidate for a trial of labor.    Baby is moving regularly. No painful contractions, no bleeding or leaking fluid.     Obstetrical History   OB History    Para Term  AB Living   2 1 1 0 0 1   SAB IAB Ectopic Multiple Live Births   0 0 0 0 1      # Outcome Date GA Lbr Yuri/2nd Weight Sex Delivery Anes PTL Lv   2 Current            1 Term 22   4.082 kg F CS-Unspec Spinal N MICHAEL      Complications: Breech birth ([facility])       Past Medical History  Past Medical History:   Diagnosis Date    Migraine headache 10/03/2023    Personal history of other specified conditions 2015    History of headache        Past Surgical History   Past Surgical History:   Procedure Laterality Date     SECTION, LOW TRANSVERSE      OTHER SURGICAL HISTORY  2018    Aspiration Of Ovarian Cyst       Social History  Social History     Tobacco Use    Smoking status: Never    Smokeless tobacco: Never   Substance Use Topics    Alcohol use: Not Currently     Substance and Sexual Activity   Drug Use Never       Allergies  Amoxicillin     Medications  (Not in a hospital admission)      Objective    Last Vitals  Temp Pulse Resp BP MAP O2 Sat                   Physical Examination  GENERAL: Examination reveals a well developed, well nourished, gravid female in no acute distress. She is alert and cooperative.  ABDOMEN: soft, gravid, nontender, nondistended, no abnormal masses, no epigastric pain  FHR is  , with  , and a   tracing.    [place] reading:    CERVIX:   cm dilated,   % effaced,    station; MEMBRANES are    PSYCHOLOGICAL: awake and alert; oriented to person, place, and time    Lab Review  Labs in chart were reviewed.

## 2024-04-23 ENCOUNTER — HOSPITAL ENCOUNTER (INPATIENT)
Facility: HOSPITAL | Age: 29
LOS: 1 days | Discharge: HOME | End: 2024-04-24
Attending: OBSTETRICS & GYNECOLOGY | Admitting: OBSTETRICS & GYNECOLOGY
Payer: COMMERCIAL

## 2024-04-23 ENCOUNTER — APPOINTMENT (OUTPATIENT)
Dept: OBSTETRICS AND GYNECOLOGY | Facility: HOSPITAL | Age: 29
End: 2024-04-23
Payer: COMMERCIAL

## 2024-04-23 ENCOUNTER — ANESTHESIA (OUTPATIENT)
Dept: OBSTETRICS AND GYNECOLOGY | Facility: HOSPITAL | Age: 29
End: 2024-04-23
Payer: COMMERCIAL

## 2024-04-23 ENCOUNTER — ANESTHESIA EVENT (OUTPATIENT)
Dept: OBSTETRICS AND GYNECOLOGY | Facility: HOSPITAL | Age: 29
End: 2024-04-23
Payer: COMMERCIAL

## 2024-04-23 DIAGNOSIS — Z34.83 PRENATAL CARE, SUBSEQUENT PREGNANCY, THIRD TRIMESTER (HHS-HCC): ICD-10-CM

## 2024-04-23 DIAGNOSIS — O34.219: Primary | ICD-10-CM

## 2024-04-23 PROBLEM — Z34.90 TERM PREGNANCY (HHS-HCC): Status: RESOLVED | Noted: 2024-04-23 | Resolved: 2024-04-23

## 2024-04-23 PROBLEM — R10.2 PELVIC AND PERINEAL PAIN: Status: RESOLVED | Noted: 2023-10-03 | Resolved: 2024-04-23

## 2024-04-23 PROBLEM — Z34.90 TERM PREGNANCY (HHS-HCC): Status: ACTIVE | Noted: 2024-04-23

## 2024-04-23 PROBLEM — N92.6 MISSED PERIOD: Status: RESOLVED | Noted: 2023-10-03 | Resolved: 2024-04-23

## 2024-04-23 PROBLEM — Z87.42 HISTORY OF OVARIAN CYST: Status: RESOLVED | Noted: 2023-10-03 | Resolved: 2024-04-23

## 2024-04-23 PROBLEM — Z34.92 SECOND TRIMESTER PREGNANCY (HHS-HCC): Status: RESOLVED | Noted: 2023-10-03 | Resolved: 2024-04-23

## 2024-04-23 LAB
ABO GROUP (TYPE) IN BLOOD: NORMAL
ANTIBODY SCREEN: NORMAL
ERYTHROCYTE [DISTWIDTH] IN BLOOD BY AUTOMATED COUNT: 14.7 % (ref 11.5–14.5)
HCT VFR BLD AUTO: 39.8 % (ref 36–46)
HGB BLD-MCNC: 12.1 G/DL (ref 12–16)
MCH RBC QN AUTO: 23.4 PG (ref 26–34)
MCHC RBC AUTO-ENTMCNC: 30.4 G/DL (ref 32–36)
MCV RBC AUTO: 77 FL (ref 80–100)
NRBC BLD-RTO: 0 /100 WBCS (ref 0–0)
PLATELET # BLD AUTO: 318 X10*3/UL (ref 150–450)
RBC # BLD AUTO: 5.17 X10*6/UL (ref 4–5.2)
RH FACTOR (ANTIGEN D): NORMAL
TREPONEMA PALLIDUM IGG+IGM AB [PRESENCE] IN SERUM OR PLASMA BY IMMUNOASSAY: NONREACTIVE
WBC # BLD AUTO: 8.2 X10*3/UL (ref 4.4–11.3)

## 2024-04-23 PROCEDURE — 2500000001 HC RX 250 WO HCPCS SELF ADMINISTERED DRUGS (ALT 637 FOR MEDICARE OP)

## 2024-04-23 PROCEDURE — 59409 OBSTETRICAL CARE: CPT | Performed by: OBSTETRICS & GYNECOLOGY

## 2024-04-23 PROCEDURE — 36415 COLL VENOUS BLD VENIPUNCTURE: CPT | Performed by: OBSTETRICS & GYNECOLOGY

## 2024-04-23 PROCEDURE — 7210000002 HC LABOR PER HOUR

## 2024-04-23 PROCEDURE — 3E033VJ INTRODUCTION OF OTHER HORMONE INTO PERIPHERAL VEIN, PERCUTANEOUS APPROACH: ICD-10-PCS | Performed by: OBSTETRICS & GYNECOLOGY

## 2024-04-23 PROCEDURE — 51701 INSERT BLADDER CATHETER: CPT

## 2024-04-23 PROCEDURE — 7100000016 HC LABOR RECOVERY PER HOUR

## 2024-04-23 PROCEDURE — 1120000001 HC OB PRIVATE ROOM DAILY

## 2024-04-23 PROCEDURE — 2500000004 HC RX 250 GENERAL PHARMACY W/ HCPCS (ALT 636 FOR OP/ED): Performed by: NURSE ANESTHETIST, CERTIFIED REGISTERED

## 2024-04-23 PROCEDURE — 2500000004 HC RX 250 GENERAL PHARMACY W/ HCPCS (ALT 636 FOR OP/ED): Performed by: OBSTETRICS & GYNECOLOGY

## 2024-04-23 PROCEDURE — 86901 BLOOD TYPING SEROLOGIC RH(D): CPT | Performed by: OBSTETRICS & GYNECOLOGY

## 2024-04-23 PROCEDURE — 59050 FETAL MONITOR W/REPORT: CPT

## 2024-04-23 PROCEDURE — 86780 TREPONEMA PALLIDUM: CPT | Mod: GEALAB | Performed by: OBSTETRICS & GYNECOLOGY

## 2024-04-23 PROCEDURE — 01967 NEURAXL LBR ANES VAG DLVR: CPT | Performed by: NURSE ANESTHETIST, CERTIFIED REGISTERED

## 2024-04-23 PROCEDURE — 85027 COMPLETE CBC AUTOMATED: CPT | Performed by: OBSTETRICS & GYNECOLOGY

## 2024-04-23 PROCEDURE — 10907ZC DRAINAGE OF AMNIOTIC FLUID, THERAPEUTIC FROM PRODUCTS OF CONCEPTION, VIA NATURAL OR ARTIFICIAL OPENING: ICD-10-PCS | Performed by: OBSTETRICS & GYNECOLOGY

## 2024-04-23 PROCEDURE — 0HQ9XZZ REPAIR PERINEUM SKIN, EXTERNAL APPROACH: ICD-10-PCS | Performed by: OBSTETRICS & GYNECOLOGY

## 2024-04-23 RX ORDER — OXYTOCIN 10 [USP'U]/ML
10 INJECTION, SOLUTION INTRAMUSCULAR; INTRAVENOUS ONCE AS NEEDED
Status: DISCONTINUED | OUTPATIENT
Start: 2024-04-23 | End: 2024-04-24 | Stop reason: HOSPADM

## 2024-04-23 RX ORDER — DIPHENHYDRAMINE HCL 25 MG
25 CAPSULE ORAL EVERY 6 HOURS PRN
Status: DISCONTINUED | OUTPATIENT
Start: 2024-04-23 | End: 2024-04-24 | Stop reason: HOSPADM

## 2024-04-23 RX ORDER — MISOPROSTOL 200 UG/1
800 TABLET ORAL ONCE AS NEEDED
Status: DISCONTINUED | OUTPATIENT
Start: 2024-04-23 | End: 2024-04-24 | Stop reason: HOSPADM

## 2024-04-23 RX ORDER — SODIUM CHLORIDE, SODIUM LACTATE, POTASSIUM CHLORIDE, CALCIUM CHLORIDE 600; 310; 30; 20 MG/100ML; MG/100ML; MG/100ML; MG/100ML
125 INJECTION, SOLUTION INTRAVENOUS CONTINUOUS
Status: DISCONTINUED | OUTPATIENT
Start: 2024-04-23 | End: 2024-04-24 | Stop reason: HOSPADM

## 2024-04-23 RX ORDER — FENTANYL/ROPIVACAINE/NS/PF 2MCG/ML-.2
PLASTIC BAG, INJECTION (ML) INJECTION
Status: COMPLETED
Start: 2024-04-23 | End: 2024-04-23

## 2024-04-23 RX ORDER — TERBUTALINE SULFATE 1 MG/ML
0.25 INJECTION SUBCUTANEOUS ONCE AS NEEDED
Status: DISCONTINUED | OUTPATIENT
Start: 2024-04-23 | End: 2024-04-24 | Stop reason: HOSPADM

## 2024-04-23 RX ORDER — ONDANSETRON 4 MG/1
4 TABLET, FILM COATED ORAL EVERY 6 HOURS PRN
Status: DISCONTINUED | OUTPATIENT
Start: 2024-04-23 | End: 2024-04-24 | Stop reason: HOSPADM

## 2024-04-23 RX ORDER — IBUPROFEN 600 MG/1
600 TABLET ORAL EVERY 6 HOURS
Status: DISCONTINUED | OUTPATIENT
Start: 2024-04-23 | End: 2024-04-24 | Stop reason: HOSPADM

## 2024-04-23 RX ORDER — NIFEDIPINE 10 MG/1
10 CAPSULE ORAL ONCE AS NEEDED
Status: DISCONTINUED | OUTPATIENT
Start: 2024-04-23 | End: 2024-04-24 | Stop reason: HOSPADM

## 2024-04-23 RX ORDER — IBUPROFEN 600 MG/1
600 TABLET ORAL EVERY 6 HOURS
Start: 2024-04-24

## 2024-04-23 RX ORDER — METOCLOPRAMIDE HYDROCHLORIDE 5 MG/ML
10 INJECTION INTRAMUSCULAR; INTRAVENOUS EVERY 6 HOURS PRN
Status: DISCONTINUED | OUTPATIENT
Start: 2024-04-23 | End: 2024-04-24 | Stop reason: HOSPADM

## 2024-04-23 RX ORDER — LIDOCAINE 560 MG/1
1 PATCH PERCUTANEOUS; TOPICAL; TRANSDERMAL
Status: DISCONTINUED | OUTPATIENT
Start: 2024-04-23 | End: 2024-04-24 | Stop reason: HOSPADM

## 2024-04-23 RX ORDER — LABETALOL HYDROCHLORIDE 5 MG/ML
20 INJECTION, SOLUTION INTRAVENOUS ONCE AS NEEDED
Status: DISCONTINUED | OUTPATIENT
Start: 2024-04-23 | End: 2024-04-24 | Stop reason: HOSPADM

## 2024-04-23 RX ORDER — POLYETHYLENE GLYCOL 3350 17 G/17G
17 POWDER, FOR SOLUTION ORAL 2 TIMES DAILY PRN
Status: DISCONTINUED | OUTPATIENT
Start: 2024-04-23 | End: 2024-04-24 | Stop reason: HOSPADM

## 2024-04-23 RX ORDER — FENTANYL/ROPIVACAINE/NS/PF 2MCG/ML-.2
0-25 PLASTIC BAG, INJECTION (ML) INJECTION CONTINUOUS
Status: DISCONTINUED | OUTPATIENT
Start: 2024-04-23 | End: 2024-04-24 | Stop reason: HOSPADM

## 2024-04-23 RX ORDER — ADHESIVE BANDAGE
10 BANDAGE TOPICAL
Status: DISCONTINUED | OUTPATIENT
Start: 2024-04-23 | End: 2024-04-24 | Stop reason: HOSPADM

## 2024-04-23 RX ORDER — LIDOCAINE HYDROCHLORIDE 10 MG/ML
30 INJECTION INFILTRATION; PERINEURAL ONCE AS NEEDED
Status: DISCONTINUED | OUTPATIENT
Start: 2024-04-23 | End: 2024-04-24 | Stop reason: HOSPADM

## 2024-04-23 RX ORDER — TRANEXAMIC ACID 100 MG/ML
1000 INJECTION, SOLUTION INTRAVENOUS ONCE AS NEEDED
Status: DISCONTINUED | OUTPATIENT
Start: 2024-04-23 | End: 2024-04-24 | Stop reason: HOSPADM

## 2024-04-23 RX ORDER — LOPERAMIDE HYDROCHLORIDE 2 MG/1
4 CAPSULE ORAL EVERY 2 HOUR PRN
Status: DISCONTINUED | OUTPATIENT
Start: 2024-04-23 | End: 2024-04-24 | Stop reason: HOSPADM

## 2024-04-23 RX ORDER — BUTORPHANOL TARTRATE 2 MG/ML
1 INJECTION INTRAMUSCULAR; INTRAVENOUS EVERY 10 MIN PRN
Status: DISCONTINUED | OUTPATIENT
Start: 2024-04-23 | End: 2024-04-24 | Stop reason: HOSPADM

## 2024-04-23 RX ORDER — BISACODYL 10 MG/1
10 SUPPOSITORY RECTAL DAILY PRN
Status: DISCONTINUED | OUTPATIENT
Start: 2024-04-23 | End: 2024-04-24 | Stop reason: HOSPADM

## 2024-04-23 RX ORDER — ACETAMINOPHEN 325 MG/1
975 TABLET ORAL EVERY 6 HOURS
Start: 2024-04-24

## 2024-04-23 RX ORDER — CARBOPROST TROMETHAMINE 250 UG/ML
250 INJECTION, SOLUTION INTRAMUSCULAR ONCE AS NEEDED
Status: DISCONTINUED | OUTPATIENT
Start: 2024-04-23 | End: 2024-04-24 | Stop reason: HOSPADM

## 2024-04-23 RX ORDER — SIMETHICONE 80 MG
80 TABLET,CHEWABLE ORAL 4 TIMES DAILY PRN
Status: DISCONTINUED | OUTPATIENT
Start: 2024-04-23 | End: 2024-04-24 | Stop reason: HOSPADM

## 2024-04-23 RX ORDER — HYDRALAZINE HYDROCHLORIDE 20 MG/ML
5 INJECTION INTRAMUSCULAR; INTRAVENOUS ONCE AS NEEDED
Status: DISCONTINUED | OUTPATIENT
Start: 2024-04-23 | End: 2024-04-24 | Stop reason: HOSPADM

## 2024-04-23 RX ORDER — ONDANSETRON HYDROCHLORIDE 2 MG/ML
4 INJECTION, SOLUTION INTRAVENOUS EVERY 6 HOURS PRN
Status: DISCONTINUED | OUTPATIENT
Start: 2024-04-23 | End: 2024-04-24 | Stop reason: HOSPADM

## 2024-04-23 RX ORDER — METHYLERGONOVINE MALEATE 0.2 MG/ML
0.2 INJECTION INTRAVENOUS ONCE AS NEEDED
Status: DISCONTINUED | OUTPATIENT
Start: 2024-04-23 | End: 2024-04-24 | Stop reason: HOSPADM

## 2024-04-23 RX ORDER — OXYTOCIN/0.9 % SODIUM CHLORIDE 30/500 ML
2-30 PLASTIC BAG, INJECTION (ML) INTRAVENOUS CONTINUOUS
Status: DISCONTINUED | OUTPATIENT
Start: 2024-04-23 | End: 2024-04-24 | Stop reason: HOSPADM

## 2024-04-23 RX ORDER — ACETAMINOPHEN 325 MG/1
975 TABLET ORAL EVERY 6 HOURS
Status: DISCONTINUED | OUTPATIENT
Start: 2024-04-23 | End: 2024-04-24 | Stop reason: HOSPADM

## 2024-04-23 RX ORDER — OXYTOCIN/0.9 % SODIUM CHLORIDE 30/500 ML
60 PLASTIC BAG, INJECTION (ML) INTRAVENOUS ONCE AS NEEDED
Status: DISCONTINUED | OUTPATIENT
Start: 2024-04-23 | End: 2024-04-24 | Stop reason: HOSPADM

## 2024-04-23 RX ORDER — METOCLOPRAMIDE 10 MG/1
10 TABLET ORAL EVERY 6 HOURS PRN
Status: DISCONTINUED | OUTPATIENT
Start: 2024-04-23 | End: 2024-04-24 | Stop reason: HOSPADM

## 2024-04-23 RX ORDER — DIPHENHYDRAMINE HYDROCHLORIDE 50 MG/ML
25 INJECTION INTRAMUSCULAR; INTRAVENOUS EVERY 6 HOURS PRN
Status: DISCONTINUED | OUTPATIENT
Start: 2024-04-23 | End: 2024-04-24 | Stop reason: HOSPADM

## 2024-04-23 RX ADMIN — Medication 5 ML: at 11:19

## 2024-04-23 RX ADMIN — ACETAMINOPHEN 975 MG: 325 TABLET ORAL at 21:41

## 2024-04-23 RX ADMIN — SODIUM CHLORIDE, POTASSIUM CHLORIDE, SODIUM LACTATE AND CALCIUM CHLORIDE 125 ML/HR: 600; 310; 30; 20 INJECTION, SOLUTION INTRAVENOUS at 08:45

## 2024-04-23 RX ADMIN — SODIUM CHLORIDE, POTASSIUM CHLORIDE, SODIUM LACTATE AND CALCIUM CHLORIDE 125 ML/HR: 600; 310; 30; 20 INJECTION, SOLUTION INTRAVENOUS at 11:46

## 2024-04-23 RX ADMIN — IBUPROFEN 600 MG: 600 TABLET, FILM COATED ORAL at 21:41

## 2024-04-23 RX ADMIN — IBUPROFEN 600 MG: 600 TABLET, FILM COATED ORAL at 15:27

## 2024-04-23 RX ADMIN — Medication 5 ML: at 11:27

## 2024-04-23 RX ADMIN — ACETAMINOPHEN 975 MG: 325 TABLET ORAL at 15:27

## 2024-04-23 RX ADMIN — Medication 2 MILLI-UNITS/MIN: at 08:45

## 2024-04-23 RX ADMIN — Medication 12 ML/HR: at 11:30

## 2024-04-23 SDOH — SOCIAL STABILITY: SOCIAL INSECURITY: ARE THERE ANY APPARENT SIGNS OF INJURIES/BEHAVIORS THAT COULD BE RELATED TO ABUSE/NEGLECT?: NO

## 2024-04-23 SDOH — SOCIAL STABILITY: SOCIAL INSECURITY: PHYSICAL ABUSE: DENIES

## 2024-04-23 SDOH — HEALTH STABILITY: MENTAL HEALTH: NON-SPECIFIC ACTIVE SUICIDAL THOUGHTS (PAST 1 MONTH): NO

## 2024-04-23 SDOH — SOCIAL STABILITY: SOCIAL INSECURITY: VERBAL ABUSE: DENIES

## 2024-04-23 SDOH — SOCIAL STABILITY: SOCIAL INSECURITY: HAVE YOU HAD THOUGHTS OF HARMING ANYONE ELSE?: NO

## 2024-04-23 SDOH — HEALTH STABILITY: MENTAL HEALTH: WERE YOU ABLE TO COMPLETE ALL THE BEHAVIORAL HEALTH SCREENINGS?: YES

## 2024-04-23 SDOH — HEALTH STABILITY: MENTAL HEALTH: WISH TO BE DEAD (PAST 1 MONTH): NO

## 2024-04-23 SDOH — SOCIAL STABILITY: SOCIAL INSECURITY: HAVE YOU HAD ANY THOUGHTS OF HARMING ANYONE ELSE?: NO

## 2024-04-23 SDOH — SOCIAL STABILITY: SOCIAL INSECURITY: DO YOU FEEL ANYONE HAS EXPLOITED OR TAKEN ADVANTAGE OF YOU FINANCIALLY OR OF YOUR PERSONAL PROPERTY?: NO

## 2024-04-23 SDOH — SOCIAL STABILITY: SOCIAL INSECURITY: HAS ANYONE EVER THREATENED TO HURT YOUR FAMILY OR YOUR PETS?: NO

## 2024-04-23 SDOH — HEALTH STABILITY: MENTAL HEALTH: SUICIDAL BEHAVIOR (LIFETIME): NO

## 2024-04-23 SDOH — SOCIAL STABILITY: SOCIAL INSECURITY: ARE YOU OR HAVE YOU BEEN THREATENED OR ABUSED PHYSICALLY, EMOTIONALLY, OR SEXUALLY BY ANYONE?: NO

## 2024-04-23 SDOH — SOCIAL STABILITY: SOCIAL INSECURITY: ABUSE SCREEN: ADULT

## 2024-04-23 SDOH — ECONOMIC STABILITY: HOUSING INSECURITY: DO YOU FEEL UNSAFE GOING BACK TO THE PLACE WHERE YOU ARE LIVING?: NO

## 2024-04-23 SDOH — SOCIAL STABILITY: SOCIAL INSECURITY: DOES ANYONE TRY TO KEEP YOU FROM HAVING/CONTACTING OTHER FRIENDS OR DOING THINGS OUTSIDE YOUR HOME?: NO

## 2024-04-23 ASSESSMENT — PAIN SCALES - GENERAL
PAINLEVEL_OUTOF10: 4
PAINLEVEL_OUTOF10: 0 - NO PAIN
PAINLEVEL_OUTOF10: 4
PAINLEVEL_OUTOF10: 0 - NO PAIN
PAINLEVEL_OUTOF10: 2
PAINLEVEL_OUTOF10: 7
PAINLEVEL_OUTOF10: 3
PAINLEVEL_OUTOF10: 2
PAINLEVEL_OUTOF10: 0 - NO PAIN
PAINLEVEL_OUTOF10: 1
PAINLEVEL_OUTOF10: 5 - MODERATE PAIN
PAINLEVEL_OUTOF10: 0 - NO PAIN
PAIN_LEVEL: 1
PAINLEVEL_OUTOF10: 3
PAINLEVEL_OUTOF10: 3

## 2024-04-23 ASSESSMENT — LIFESTYLE VARIABLES
HOW OFTEN DO YOU HAVE 6 OR MORE DRINKS ON ONE OCCASION: NEVER
AUDIT-C TOTAL SCORE: 0
SKIP TO QUESTIONS 9-10: 1
HOW OFTEN DO YOU HAVE A DRINK CONTAINING ALCOHOL: NEVER
HOW MANY STANDARD DRINKS CONTAINING ALCOHOL DO YOU HAVE ON A TYPICAL DAY: PATIENT DOES NOT DRINK
AUDIT-C TOTAL SCORE: 0

## 2024-04-23 ASSESSMENT — PAIN - FUNCTIONAL ASSESSMENT
PAIN_FUNCTIONAL_ASSESSMENT: 0-10
PAIN_FUNCTIONAL_ASSESSMENT: 0-10

## 2024-04-23 ASSESSMENT — PATIENT HEALTH QUESTIONNAIRE - PHQ9
SUM OF ALL RESPONSES TO PHQ9 QUESTIONS 1 & 2: 0
2. FEELING DOWN, DEPRESSED OR HOPELESS: NOT AT ALL
1. LITTLE INTEREST OR PLEASURE IN DOING THINGS: NOT AT ALL

## 2024-04-23 ASSESSMENT — PAIN DESCRIPTION - DESCRIPTORS: DESCRIPTORS: SORE;SHOOTING

## 2024-04-23 ASSESSMENT — ACTIVITIES OF DAILY LIVING (ADL): LACK_OF_TRANSPORTATION: NO

## 2024-04-23 ASSESSMENT — PAIN DESCRIPTION - LOCATION: LOCATION: PERINEUM

## 2024-04-23 NOTE — LACTATION NOTE
This note was copied from a baby's chart.  Lactation Consultant Note  Lactation Consultation  Reason for Consult: Initial assessment  Consultant Name: LAUREN Lipscomb    Maternal Information  Has mother  before?: Yes  How long did the mother previously breastfeed?: 6 weeks with supplementation  Previous Maternal Breastfeeding Challenges: Low milk supply  Infant to breast within first 2 hours of birth?: Yes  Exclusive Pump and Bottle Feed: No    Maternal Assessment  Breast Assessment: Medium, Symmetrical, Warm, Compressible, Soft  Nipple Assessment: Intact, Rounded after feeding, Erect  [place] Assessment: Normal    Infant Assessment  Infant Behavior: Quiet alert, Readiness to feed, Feeding cues observed, Suckles on and off, needs stimulation, Sucking, Rooting response, Content after feeding  Infant Assessment:  (Full term infant, approximately 1 HOL)    Feeding Assessment  Nutrition Source: Breastmilk  Feeding Method: Nursing at the breast  Feeding Position: Skin to skin, Cradle, Cross - cradle, Breast sandwich, Football/seated, Infant not tucked close and facing mother, Baby's head too high over breast, Both sides, Mother needs assistance with latch/positioning, Misalignment of baby's head, trunk, and hips  Suck/Feeding: Sustained, Tactile stimulation needed, Content after feeding  Latch Assessment: Minimal assistance is needed, Deep latch obtained, Comfortable latch, Bursts of sucking, swallowing, and rest, Comfortable with no pain (clicking occasionally)    LATCH TOOL  Latch: Repeated attempts, hold nipple in mouth, stimulate to suck  Audible Swallowing: A few with stimulation  Type of Nipple: Everted (After stimulation)  Comfort (Breast/Nipple): Soft/non-tender  Hold (Positioning): Minimal assist, teach one side, mother does other, staff holds  LATCH Score: 7    Patient Follow-up  Inpatient Lactation Follow-up Needed : Yes    Other OB Lactation Documentation  Maternal Risk Factors: Previous low  supply    Recommendations/Summary  28 year old  breastfeeding mother and infant. Reports breastfeeding her first child for six weeks but reported needing supplementation due to elevated bilirubin and that they continued using supplementation. Mother is unsure of how long she would like to breastfeed this infant. Mother reports positive breast changes during pregnancy. Denies history of breast or nipple surgery.     Met with parents for initial lactation consult to assess breastfeeding goals and to assist with initial feeding. Mother reports that infant had nursed for five minutes previously but then was not interested. Mother willing to attempt again. Reviewed deep latch techniques, cross cradle hold and breast shaping. Assisted mother with latching infant. Infant eager and quickly achieved a deep latch. Mother able to sustain breast shaping and holding infant properly. Mother reports latch as comfortable and infant has a deep latch. Infant needing occasional tactile stimulation to stay active. After about twenty minutes infant sleeping at the breast. LC encouraged mother to place infant on chest to burp/stimulate. Second side then offered in football hold. Deep latch quickly obtained. Occasional clicking noted. Encouraged mother to break infants seal and re-latch infant. Infant again achieved a deep latch. Infant eventually popped off. Mothers nipples rounded after feeding and infant content on mothers chest.     Breastfeeding handouts provided. Breastfeeding education and support provided throughout consult. Parents provided with the opportunity to ask questions. All questions answered. See education flow sheet for detailed list of education topics covered. Reviewed importance of frequent skin to skin contact, waking techniques, infant stomach capacity, value of colostrum feeds and typical  feeding behaviors in the first 24 hours. Encouraged frequent skin to skin and nursing with cues and at least 8  times in the first 24 hours. Reviewed signs of adequate intake/output. Parents deny any further questions or concerns at this time. Has a personal breast pump for home/work use. Offered ongoing breastfeeding assistance, support and education as needed and desired.

## 2024-04-23 NOTE — ANESTHESIA PROCEDURE NOTES
Epidural Block    Patient location during procedure: OB  Start time: 4/23/2024 11:14 AM  End time: 4/23/2024 11:15 AM  Reason for block: labor analgesia  Staffing  Performed: CRNA   Authorized by: COLLIN Narvaez    Performed by: COLLIN Narvaez    Preanesthetic Checklist  Completed: patient identified, IV checked, risks and benefits discussed, surgical consent, pre-op evaluation, timeout performed and sterile techniques followed  Block Timeout  RN/Licensed healthcare professional reads aloud to the Anesthesia provider and entire team: Patient identity, procedure with side and site, patient position, and as applicable the availability of implants/special equipment/special requirements.  Patient on coagulant treatment: no  Timeout performed at: 4/23/2024 11:10 AM  Block Placement  Patient position: sitting  Prep: ChloraPrep  Sterility prep: cap, gloves and mask  Sedation level: no sedation  Patient monitoring: blood pressure, continuous pulse oximetry and heart rate  Approach: midline  Local numbing: lidocaine 1% to skin and subcutaneous tissues  Vertebral space: lumbar  Lumbar location: L4-L5  Epidural  Loss of resistance technique: air  Guidance: landmark technique        Needle  Needle type: Tuohy   Needle gauge: 17  Needle length: 8.9cm  Needle insertion depth: 7 cm  Catheter type: multi-orifice  Catheter size: 19 G  Catheter at skin depth: 15 cm  Catheter securement method: clear occlusive dressing    Test dose: lidocaine 1.5% with epinephrine 1-to-200,000  Test dose: lidocaine 1.5% with epinephrine 1-to-200,000  Test dose result: no positive test dose            Assessment bilateral  Block outcome: patient comfortable  Number of attempts: 1  Events: no positive test dose  Procedure assessment: patient tolerated procedure well with no immediate complications

## 2024-04-23 NOTE — H&P
Obstetrical Admission History and Physical      Che Mckeon is a 28 y.o.  at 39w3d. HECTOR: 2024, by Last Menstrual Period. She has had prenatal care with [facility] .  Prenatal care has been uncomplicated.  She had a prior LTCS for breech. Desires trial of labor this pregnancy.  First baby 9 lbs.     Chief Complaint: induction of labor.     Assessment/Plan   27 yo  at 39.2 weeks presents for TOLAC induction.     Admit to L&D  Continuous fetal monitoring  Place IV, start IV Fluids  CBC, T&S ordered  Pitocin, then AROM for induction.  GBS is negative.  Plan epidural.     Active Problems:  There are no active Hospital Problems.      Pregnancy Problems (from 10/05/23 to present)         No problems associated with this episode.               Baby is moving regularly. No painful contractions, no bleeding or leaking fluid.  Obstetrical History                    OB History    Para Term  AB Living   2 1 1 0 0 1   SAB IAB Ectopic Multiple Live Births      0 0 0 0 1          # Outcome Date GA Lbr Yuri/2nd Weight Sex Delivery Anes PTL Lv   2 Current                     1 Term 22     4.082 kg F CS-Unspec Spinal N MICHAEL      Complications: Breech birth ([facility])         Past Medical History  Medical History        Past Medical History:   Diagnosis Date    Migraine headache 10/03/2023    Personal history of other specified conditions 2015     History of headache            Past Surgical History   Surgical History         Past Surgical History:   Procedure Laterality Date     SECTION, LOW TRANSVERSE        OTHER SURGICAL HISTORY   2018     Aspiration Of Ovarian Cyst            Social History  Social History           Tobacco Use    Smoking status: Never    Smokeless tobacco: Never   Substance Use Topics    Alcohol use: Not Currently          Substance and Sexual Activity   Drug Use Never         Allergies  Amoxicillin      Medications    Prescriptions Prior to Admission   (Not in  a hospital admission)         Objective    Last Vitals  Temp Pulse Resp BP MAP O2 Sat              Physical Examination  GENERAL: Examination reveals a well developed, well nourished, gravid female in no acute distress. She is alert and cooperative.  ABDOMEN: soft, gravid, nontender, nondistended, no abnormal masses, no epigastric pain  FHR is 120s  , with moderate variability , and a Category I  tracing.    [place] reading:    CERVIX: 3  cm dilated,   70% effaced,  -3 station; MEMBRANES are    Vtx by bedside ultrasound.  PSYCHOLOGICAL: awake and alert; oriented to person, place, and time     Lab Review  Labs in chart were reviewed.

## 2024-04-23 NOTE — ANESTHESIA PREPROCEDURE EVALUATION
Patient: Che Mckeon    Evaluation Method: In-person visit    Procedure Information    Date: 04/23/24  Procedure: Labor Analgesia         Relevant Problems   HEENT   (+) Chronic sinusitis       Clinical information reviewed:   Tobacco  Allergies  Meds  Problems  Med Hx  Surg Hx   Fam Hx          NPO Detail:  No data recorded     OB/GYN     Physical Exam    Airway  Mallampati: II  TM distance: <3 FB  Neck ROM: full     Cardiovascular - normal exam     Dental - normal exam     Pulmonary - normal exam     Abdominal      Other findings: Nasal piercing      Anesthesia Plan    History of general anesthesia?: yes  History of complications of general anesthesia?: no    ASA 2     epidural     The patient is not a current smoker.  Patient was not previously instructed to abstain from smoking on day of procedure.  Patient did not smoke on day of procedure.    Anesthetic plan and risks discussed with patient and spouse.  Use of blood products discussed with patient and spouse who consented to blood products.

## 2024-04-23 NOTE — PROGRESS NOTES
Patient feeling contractions but not uncomfortable.  VE: 4/80/-2 AROM clear.  FHR: 120s with moderate variability and accels. Category I.  Ctxs: q 3-5 min.  P: Continue Pitocin.  Epidural prn.

## 2024-04-23 NOTE — ANESTHESIA POSTPROCEDURE EVALUATION
Patient: Che Mckeon    Procedure Summary       Date: 04/23/24 Room / Location:     Anesthesia Start: 1114 Anesthesia Stop: 1414    Procedure: Labor Analgesia Diagnosis:     Scheduled Providers:  Responsible Provider: COLLIN Narvaez    Anesthesia Type: Not recorded ASA Status: Not recorded            Anesthesia Type: No value filed.    Vitals Value Taken Time   /63 04/23/24 1635   Temp 36.5 04/23/24 1645   Pulse 82 04/23/24 1635   Resp 16 04/23/24 1635   SpO2 98 04/23/24 1645       Anesthesia Post Evaluation    Patient location during evaluation: bedside  Patient participation: complete - patient participated  Level of consciousness: awake  Pain score: 1  Pain management: satisfactory to patient  Airway patency: patent  Cardiovascular status: acceptable  Respiratory status: acceptable  Hydration status: acceptable  Postoperative Nausea and Vomiting: none      No notable events documented.

## 2024-04-23 NOTE — CARE PLAN
Problem: Vaginal Birth or  Section  Goal: Fetal and maternal status remain reassuring during the birth process  Outcome: Met  Goal: Prevention of malpresentation/labor dystocia through delivery  Outcome: Met  Goal: Demonstrates labor coping techniques through delivery  Outcome: Met   The patient's goals for the shift include  safe labor and delivery    The clinical goals for the shift include Safe labor and delivery.

## 2024-04-23 NOTE — L&D DELIVERY NOTE
OB Delivery Note  2024  Che Mckeon  28 y.o.   Vaginal, Spontaneous        Gestational Age: 39w3d  /Para:   Quantitative Blood Loss: Admission to Discharge: 200 mL (2024  7:35 AM - 2024  3:08 PM)    Michelle Mckeon [[id number]]      Labor Events    Rupture date/time: 2024 1030  Rupture type: Artificial  Fluid color: Clear  Fluid odor: None  Labor type: Induced Onset of Labor  Labor allowed to proceed with plans for an attempted vaginal birth?: Yes  Induction: Oxytocin  First cervical ripening date/time: 202445  Induction date/time: 2024 0735  Induction indications: Risk Reducing  Complications: None       Labor Event Times    Labor onset date/time: 2024 0845  Dilation complete date/time: 2024 1353  Start pushing date/time: 2024 1354       Placenta    Placenta delivery date/time: 2024 1416  Placenta removal: Spontaneous  Placenta appearance: Intact  Placenta disposition: discarded       Cord    Vessels: 3 vessels  Complications: Nuchal  Nuchal intervention: reduced  Nuchal cord description: loose nuchal cord  Number of loops: 1  Delayed cord clamping?: Yes  Cord blood disposition: Discarded  Gases sent?: No  Stem cell collection (by provider): No       Lacerations    Episiotomy: None  Perineal laceration: None  Perineal laceration repaired?: Yes  Labial laceration?: Yes  Labial laceration location: right  Labial laceration repaired?: Yes  Vaginal laceration?: Yes  Vaginal laceration location: right  Repair suture: 3-0 Synthetic Suture, 4-0 Synthetic Suture       Anesthesia    Method: Epidural       Operative Delivery    Forceps attempted?: No  Vacuum extractor attempted?: No       Shoulder Dystocia    Shoulder dystocia present?: No        Delivery    Time head delivered: 2024 14:13:00  Birth date/time: 2024 14:14:00  Delivery type: Vaginal, Spontaneous  Complications: None       Resuscitation    Method: Tactile stimulation        Apgars    Living status: Living  Apgar Component Scores:  1 min.:  5 min.:  10 min.:  15 min.:  20 min.:    Skin color:  0  1       Heart rate:  2  2       Reflex irritability:  2  2       Muscle tone:  2  2       Respiratory effort:  2  2       Total:  8  9       Apgars assigned by: CHELSIE EMANUEL RN       Delivery Providers    Delivering clinician: Dang Murguia MD   Provider Role    Makenzie Ware, RN Delivery Nurse    Reina Hoffman, RN Nursery Nurse     Resident                 Patient pushed for 20 minutes,  after previous C section due to breech presentation, delayed cord clamping and infant was suctioned. Placenta was delivered spontaneously, no abnormalities noted, intact.   Patient had epidural placed, lidocaine given 20cc of lidocaine into vagina. Right vaginal laceration extending to right labia majora. 2 cm right labial hematoma that remained stable and soft. 3-0 and 4-0 vicryl used to repair the vagina and labia.  Bleeding ceased, and patient tolerated well.    Patient seen and discussed with Dr. Blade Perez MD  Family Medicine, PGY2

## 2024-04-24 VITALS
RESPIRATION RATE: 16 BRPM | WEIGHT: 185.19 LBS | HEART RATE: 71 BPM | TEMPERATURE: 98.8 F | OXYGEN SATURATION: 98 % | BODY MASS INDEX: 27.43 KG/M2 | HEIGHT: 69 IN | SYSTOLIC BLOOD PRESSURE: 107 MMHG | DIASTOLIC BLOOD PRESSURE: 64 MMHG

## 2024-04-24 PROCEDURE — 2500000001 HC RX 250 WO HCPCS SELF ADMINISTERED DRUGS (ALT 637 FOR MEDICARE OP)

## 2024-04-24 RX ADMIN — ACETAMINOPHEN 975 MG: 325 TABLET ORAL at 03:56

## 2024-04-24 RX ADMIN — ACETAMINOPHEN 975 MG: 325 TABLET ORAL at 10:06

## 2024-04-24 RX ADMIN — IBUPROFEN 600 MG: 600 TABLET, FILM COATED ORAL at 03:56

## 2024-04-24 RX ADMIN — IBUPROFEN 600 MG: 600 TABLET, FILM COATED ORAL at 10:06

## 2024-04-24 SDOH — HEALTH STABILITY: MENTAL HEALTH: CURRENT SMOKER: 0

## 2024-04-24 ASSESSMENT — PAIN SCALES - GENERAL
PAINLEVEL_OUTOF10: 4
PAINLEVEL_OUTOF10: 0 - NO PAIN
PAINLEVEL_OUTOF10: 2
PAINLEVEL_OUTOF10: 4

## 2024-04-24 ASSESSMENT — PAIN - FUNCTIONAL ASSESSMENT: PAIN_FUNCTIONAL_ASSESSMENT: 0-10

## 2024-04-24 ASSESSMENT — PAIN DESCRIPTION - LOCATION: LOCATION: PERINEUM

## 2024-04-24 NOTE — CARE PLAN
The patient's goals for the shift include breastfeeding; bonding with infant; pain management; rest.    The clinical goals for the shift include breastfeeding; bonding with infant; pain management; rest.    Over the shift, the patient did not make progress toward the following goals. Barriers to progression include none  . Recommendations to address these barriers include none  .

## 2024-04-24 NOTE — DISCHARGE INSTRUCTIONS
"  [facility]  [address]  [address], [place] [address]  [address], [place] [address]  Telephone: [phone] [place] or [phone] [place]    After Discharge Orders:  Call the office and schedule an appointment for a 6 week postpartum visit.       Medical equipment: Breast pump     After your delivery - signs and symptoms to watch for:  Fever - Oral temperature greater than 100.4 degrees Fahrenheit  Foul-smelling vaginal discharge  Headache unrelieved by \"pain meds\"  Difficulty urinating  Breasts reddened, hard, hot to the touch  Nipple discharge which is foul-smelling or contains pus  Increased pain at the site of the laceration  Difficulty breathing with or without chest pain  New calf pain especially if only on one side  Sudden, continuing increased vaginal bleeding with or without clots  Unrelieved feelings of:  Inability to cope  Sadness  Anxiety  Lack of interest in baby  Insomnia  Crying     What to do at home:  See patient education handouts for full information  Resume activity gradually   Don't lift anything heavier than baby and carrier until OK'd by your Physician  No sex until OK'd by your Physician or Midwife  Take care of yourself by sleeping/resting as much as possible  Eat regular nutritious meals  Let someone else care for you, your baby, and housework as much as possible   Take pain medication as prescribed whenever you need them  Wear compression stockings if prescribed   To avoid/relieve constipation take stool softeners if advised   Drink lots of water/fruit juices  Increase fiber in your diet  Breast care: Wear support bra ; use lanolin ointment/cream, nipple shields, or cool compresses as needed     Refer to  Discharge Instructions for problems or follow-up regarding  nursing    "

## 2024-04-24 NOTE — DISCHARGE SUMMARY
Discharge Summary    Admission Date: 2024  Discharge Date: 2024    Discharge Diagnosis  Vaginal delivery following previous  section, delivered ([facility])    Hospital Course  Delivery Date: 2024  2:14 PM   Delivery type: Vaginal, Spontaneous    GA at delivery: 39w3d  Outcome: Living   Anesthesia during delivery: Epidural   Intrapartum complications: None   Feeding method: Breastfeeding Status: Unknown     Patient presented for induction of labor/TOLAC. She had an uncomplicated labor course and spontaneous vaginal delivery/.   There were no postpartum problems and she was discharged home postpartum day 1 in good condition.    Discharge Meds     Your medication list        START taking these medications        Instructions Last Dose Given Next Dose Due   acetaminophen 325 mg tablet  Commonly known as: Tylenol      Take 3 tablets (975 mg) by mouth every 6 hours.       ibuprofen 600 mg tablet      Take 1 tablet (600 mg) by mouth every 6 hours.              CONTINUE taking these medications        Instructions Last Dose Given Next Dose Due   PRENATAL MULTIVITAMINS ORAL                     Where to Get Your Medications        Information about where to get these medications is not yet available    Ask your nurse or doctor about these medications  acetaminophen 325 mg tablet  ibuprofen 600 mg tablet          Complications Requiring Follow-Up  Postpartum visit in 6 weeks    Test Results Pending At Discharge  Pending Labs       No current pending labs.            Outpatient Follow-Up  Future Appointments   Date Time Provider Department Center   2024  4:00 PM Dang Murguia MD [id number]         Mahsa Marcial MD

## 2024-04-24 NOTE — CARE PLAN
Problem: Postpartum  Goal: Experiences normal postpartum course  Outcome: Progressing  Goal: Appropriate maternal -  bonding  Outcome: Progressing  Goal: Establish and maintain infant feeding pattern for adequate nutrition  Outcome: Progressing  Goal: Incisions, wounds, or drain sites healing without S/S of infection  Outcome: Progressing  Goal: No s/sx infection  Outcome: Progressing  Goal: No s/sx of hemorrhage  Outcome: Progressing  Goal: Minimal s/sx of HDP and BP<160/110  Outcome: Progressing     Problem: Pain - Adult  Goal: Verbalizes/displays adequate comfort level or baseline comfort level  Outcome: Progressing     Problem: Safety - Adult  Goal: Free from fall injury  Outcome: Progressing     Problem: Discharge Planning  Goal: Discharge to home or other facility with appropriate resources  Outcome: Progressing

## 2024-04-24 NOTE — LACTATION NOTE
This note was copied from a baby's chart.  Lactation Consultant Note    Recommendations/Summary  Mother requesting LC to the bedside to assess latch. Mother has infant latched appropriately in cross cradle hold with breast shaping. Infant sleepy at times. Reviewed rousing techniques and encouraged mother to use tactile stimulation while at the breast as well as massage and compression. Mother reports understanding and was able to keep infant active at the breast on the opposite side. Encouraged mother to continue feeding about every three hours and to continue positioning infant as she is. Parents deny any further questions or concerns. Offered ongoing support and education as needed and desired.

## 2024-04-24 NOTE — CARE PLAN
The patient's goals for the shift include breastfeeding; bonding with infant; pain management; rest.    The clinical goals for the shift include breastfeeding; bonding with infant; pain management; rest.    Over the shift, the patient did not make progress toward the following goals. Barriers to progression include none. Recommendations to address these barriers include none.

## 2024-04-24 NOTE — LACTATION NOTE
This note was copied from a baby's chart.  Lactation Consultant Note  Lactation Consultation  Consultant Name: LAUREN Ramos    Maternal Information  Has mother  before?: Yes  How long did the mother previously breastfeed?: 6 weeks with supplementation  Exclusive Pump and Bottle Feed: No    Maternal Assessment  Nipple Assessment: Intact (per mother's report)    Infant Assessment  Infant Behavior: Deep sleep    Feeding Assessment  Nutrition Source: Breastmilk  Feeding Method: Nursing at the breast    LATCH TOOL       Breast Pump       Other OB Lactation Tools       Patient Follow-up  Inpatient Lactation Follow-up Needed : No  Lactation Professional - OK to Discharge: Yes    Other OB Lactation Documentation       Recommendations/Summary   breastfeeding mother and infant. Mother states that she breast fed her first child for 6 weeks with supplementation. Mother endorses positive breast changes with this pregnancy and denies any history of breast surgery. Mother has a breast pump at home for personal use.   Mother and infant to be discharged home today at 24 hours of life. Mother states that infant is feeding pretty good, but does report infant being sleepy. LC reviewed rousing techniques and normal behavior in the first 24 hours. LC reviewed cluster feeding after day one and stomach capacity. Infant's latch is deep and comfortable per mother's report. Mother denies needing an observed feeding prior to discharge.  Discharge education reviewed at this time. Mother given Breastfeeding Step-By-Step handout and reviewed. No further questions or concerns at this time.    Offered ongoing assistance with breastfeeding.

## 2024-04-24 NOTE — PROGRESS NOTES
Postpartum Progress Note    Assessment/Plan   Che Mckeon is a 28 y.o.,  at 39w3d and is now postpartum day 1.  Spontaneous vaginal delivery/    Doing well, no complications overnight.  Continue routine postpartum care.  Pain management with Tylenol and Motrin.  Regular diet.  Encourage ambulation.  SCDs and Lovenox if indicted for DVT prophylaxis.  Lactation support as needed.   She is ok for discharge home this afternoon.    Principal Problem:    Vaginal delivery following previous  section, delivered ([facility])        Subjective   Her pain is well controlled with current medications  She is passing flatus  She is ambulating well  She is tolerating a Adult diet Regular  She reports no breast or nursing problems  She denies emotional concerns today     She has no complaints this morning and is feeling ready to go home.    Objective   Allergies:   Amoxicillin    Last Vitals:  Temp Pulse Resp BP MAP Pulse Ox   36.6 °C (97.9 °F) 80 18 103/60   99 %     Vitals Min/Max Last 24 Hours:  Temp  Min: 36.5 °C (97.7 °F)  Max: 36.9 °C (98.4 °F)  Pulse  Min: 56  Max: 100  Resp  Min: 16  Max: 20  BP  Min: 94/59  Max: 130/68    Intake/Output:     Intake/Output Summary (Last 24 hours) at 2024 0809  Last data filed at 2024 1735  Gross per 24 hour   Intake 540 ml   Output 475 ml   Net 65 ml       Physical Exam:  General: no acute distress; she is awake and alert  Lungs: Normal respiratory effort  Abdomen: soft, non-tender, no distention  Fundus: firm  Extremities: trace edema, no erythema, normal movements  Psychological: appropriate mood and behavior    Lab Data:  Labs in chart were reviewed.

## 2024-04-25 ENCOUNTER — APPOINTMENT (OUTPATIENT)
Dept: OBSTETRICS AND GYNECOLOGY | Facility: CLINIC | Age: 29
End: 2024-04-25
Payer: COMMERCIAL

## 2024-06-03 NOTE — PROGRESS NOTES
Postpartum Progress Note    Assessment/Plan   Che Mckeon is a 28 y.o., , who delivered at 39w3d gestation and is now postpartum day 41.  -PAP 10/5/23 ASCUS HPV NEG  -Plans to use condoms.  -Follow up annual one year.    Active Problems:  There are no active Hospital Problems.    Pregnancy Problems (from 10/05/23 to present)       Problem Noted Resolved    Term pregnancy ([facility]) 2024 by Mahsa Marcial MD 2024 by Kerrie Marshall DO    Priority:  Medium            Hospital course: no complications       Subjective   Patient had an uncomplicated induction and /.  No GDM, HTN.  Bottle feeding.  Stay at home.  Had a right labial hematoma.  Heavy bleeding for 3 weeks, then golf ball clot, then done.    Objective   Allergies:   Amoxicillin         Last Vitals:  Temp Pulse Resp BP MAP Pulse Ox                   Vitals Min/Max Last 24 Hours:  @FLOWSTAT(6,8,9,5,[id number]:24::1)@    Intake/Output:   [unfilled]    Physical Exam:  General: Examination reveals a well developed, well nourished, female, in no acute distress. She is alert and cooperative.  Breasts: breasts appear normal for pregnancy, no suspicious masses, no skin or nipple changes or axillary nodes.  Abdomen: soft, gravid, nontender, nondistended, no abnormal masses, no epigastric pain.  Fundus: nontender.  Extremities: no redness or tenderness in the calves or thighs, no edema.  Psychological: awake and alert; oriented to person, place, and time.

## 2024-06-04 ENCOUNTER — POSTPARTUM VISIT (OUTPATIENT)
Dept: OBSTETRICS AND GYNECOLOGY | Facility: CLINIC | Age: 29
End: 2024-06-04
Payer: COMMERCIAL

## 2024-06-04 VITALS
SYSTOLIC BLOOD PRESSURE: 98 MMHG | DIASTOLIC BLOOD PRESSURE: 62 MMHG | WEIGHT: 155 LBS | BODY MASS INDEX: 22.96 KG/M2 | HEIGHT: 69 IN

## 2024-06-04 PROCEDURE — 0503F POSTPARTUM CARE VISIT: CPT | Performed by: OBSTETRICS & GYNECOLOGY

## 2024-06-04 ASSESSMENT — EDINBURGH POSTNATAL DEPRESSION SCALE (EPDS)
I HAVE BEEN SO UNHAPPY THAT I HAVE BEEN CRYING: NO, NEVER
I HAVE FELT SCARED OR PANICKY FOR NO GOOD REASON: NO, NOT MUCH
THINGS HAVE BEEN GETTING ON TOP OF ME: YES, SOMETIMES I HAVEN'T BEEN COPING AS WELL AS USUAL
I HAVE FELT SAD OR MISERABLE: NO, NOT AT ALL
I HAVE BEEN ANXIOUS OR WORRIED FOR NO GOOD REASON: HARDLY EVER
I HAVE LOOKED FORWARD WITH ENJOYMENT TO THINGS: AS MUCH AS I EVER DID
I HAVE BLAMED MYSELF UNNECESSARILY WHEN THINGS WENT WRONG: YES, SOME OF THE TIME
I HAVE BEEN SO UNHAPPY THAT I HAVE HAD DIFFICULTY SLEEPING: NOT AT ALL
TOTAL SCORE: 6
I HAVE BEEN ABLE TO LAUGH AND SEE THE FUNNY SIDE OF THINGS: AS MUCH AS I ALWAYS COULD
THE THOUGHT OF HARMING MYSELF HAS OCCURRED TO ME: NEVER

## 2025-07-21 ENCOUNTER — APPOINTMENT (OUTPATIENT)
Dept: OBSTETRICS AND GYNECOLOGY | Facility: CLINIC | Age: 30
End: 2025-07-21
Payer: COMMERCIAL

## 2025-07-21 VITALS — WEIGHT: 151 LBS | DIASTOLIC BLOOD PRESSURE: 74 MMHG | BODY MASS INDEX: 22.3 KG/M2 | SYSTOLIC BLOOD PRESSURE: 116 MMHG

## 2025-07-21 DIAGNOSIS — Z32.01 PREGNANCY TEST POSITIVE (HHS-HCC): ICD-10-CM

## 2025-07-21 DIAGNOSIS — Z34.80 ENCOUNTER FOR SUPERVISION OF OTHER NORMAL PREGNANCY, UNSPECIFIED TRIMESTER (HHS-HCC): ICD-10-CM

## 2025-07-21 DIAGNOSIS — Z34.91 INITIAL OBSTETRIC VISIT, FIRST TRIMESTER (HHS-HCC): Primary | ICD-10-CM

## 2025-07-21 LAB
CRL: 22 MM
PREGNANCY TEST URINE, POC: POSITIVE

## 2025-07-21 PROCEDURE — 0500F INITIAL PRENATAL CARE VISIT: CPT | Performed by: OBSTETRICS & GYNECOLOGY

## 2025-07-21 PROCEDURE — 81025 URINE PREGNANCY TEST: CPT | Performed by: OBSTETRICS & GYNECOLOGY

## 2025-07-21 PROCEDURE — 76817 TRANSVAGINAL US OBSTETRIC: CPT | Performed by: OBSTETRICS & GYNECOLOGY

## 2025-07-21 NOTE — PROGRESS NOTES
ASSESSMENT/PLAN    Initial obstetric visit, first trimester ([facility]) (Primary)  - US OB transvaginal    Encounter for supervision of other normal pregnancy, unspecified trimester ([facility])  OB labs ordered.   Information given about genetic screen options.   - CBC Anemia Panel With Reflex,Pregnancy; Future  - Hemoglobin A1C  - Hepatitis B Surface Antigen; Future  - Hepatitis C Antibody; Future  - HIV 1/2 Antigen/Antibody Screen with Reflex to Confirmation; Future  - Rubella Antibody, IgG; Future  - Syphilis Screen with Reflex; Future  - Type And Screen Is this order related to pregnancy or an upcoming surgery? Yes; Where will this surgery/delivery be performed? [facility]; What is the date of the surgery? 2026; Has this patient ever had a transfusion? Unknown; Has...; Future  - CBC Anemia Panel With Reflex,Pregnancy  - Hepatitis B Surface Antigen  - Hepatitis C Antibody  - HIV 1/2 Antigen/Antibody Screen with Reflex to Confirmation  - Rubella Antibody, IgG  - Syphilis Screen with Reflex  - Type And Screen Is this order related to pregnancy or an upcoming surgery? Yes; Where will this surgery/delivery be performed? [facility]; What is the date of the surgery? 2026; Has this patient ever had a transfusion? Unknown; Has...       SUBJECTIVE    LMP 2025  EGA by LMP     HPI    31 yo  LMP 2025 8 4/7 wks EGA presents for initial OB visit. Menses regular every 27-28 days.   + nausea.   Accompanied at visit by her .  S/p  2024.      ROS    Constitutional: no recent weight gain, + fatigue.   ENT: no hearing loss.  Cardiovascular: no chest pain, no palpitations.  Respiratory: no shortness of breath.  Gastrointestinal: no abdominal pain, no constipation, + nausea, no diarrhea.  Musculoskeletal: no back pain.  Lymphatic: no swollen glands.  Genitourinary: no pelvic pain, no urinary urgency, no urinary incontinence, no change in urinary frequency, no vaginal  dryness, no vaginal itching,  no vaginal discharge, no unexplained vaginal bleeding, no lesion/sore.   Breasts: no breast pain, no nipple discharge, no breast lump.   Neurological: + headache, no numbness, no dizziness.   Psychiatric: no sleep disturbances, no anxiety, no depression.   Endocrine: no hot flashes, no loss of hair, no hirsutism.      OBJECTIVE    /74   Wt 68.5 kg (151 lb)   LMP 05/22/2025 (Exact Date)   BMI 22.30 kg/m²     IO transvaginal ultrasound shows single viable IUP, CRL 22 mm, 9 1/7 wks, + FCA, + YS, c/w LMP dating.     Physical Exam     Constitutional: Alert and in no acute distress. Well developed, well nourished   Abdomen: soft nontender  Genitourinary: external genitalia: normal  Psychiatric: alert and oriented x 3., affect normal to patient baseline and mood: appropriate       María Sebastian MD

## 2025-07-21 NOTE — PATIENT INSTRUCTIONS
You were seen in the office today for confirmation of pregnancy. Your baby measured 9 1/7 weeks on ultrasound which is consistent with your LMP dating. Your due date is 2-.   Continue routine OB precautions at home  Continue taking prenatal vitamins. If you have not started prenatal vitamins you can start them now. Chewable and gummy prenatal vitamins are fine if it is difficult for you to swallow pills. Prenatal vitamins should have at least 800 mcg of Folic Acid to reduce the risk of neural tube/spinal defects in the baby. If you are currently greater than 10 weeks gestation, you should start two 81 mg Aspirin (baby Aspirin) daily to reduce the risk of elevated blood pressure/preeclampsia in the third trimester.   Routine prenatal lab work was ordered for you today and needs to be done prior to your next visit. These can be done at any  outpatient laboratory without an appointment, and do not require fasting.  Optional lab work to screen for genetic disorders can also be ordered. If these were discussed and/or ordered today the results will come separately from the rest of your routine labs, generally within 7-9 business days, and we will call you with these results. If you are still considering these labs we are happy to provide more information for review at home, and can answer any additional questions/order at the next office visit.  Avoid sick contacts and consider getting your Flu (available in office during flu season) and COVID vaccines to protect against infection in pregnancy  Make an appointment for a New OB visit in the office in the next 3-4 weeks  If you are having any concerns prior to your next visit please call the office to speak to the physician on call. This includes after hours, weekends, and holidays, when the answering service will be able to connect you with the physician on call. [phone] ([place] [facility]) or [phone] [place] [facility].

## 2025-08-07 NOTE — PROGRESS NOTES
Subjective   Patient ID [id number]   Che Mckeon is a 30 y.o.  at 11w0d with a working estimated date of delivery of 2026, by Last Menstrual Period who presents for an ENOB.  Has h/o migraines.    Last pap 10/05/2023 ASCUS/neg. No h/o abnormals per pt. Current.    Hx 25 (Dr. Sebastian): Menses regular every 27-28 days.   + nausea.   Accompanied at visit by her .  S/p  2024.    Her pregnancy is complicated by:  Hx LTCS  Hx LGA         Objective   Physical Exam  Weight: 69.4 kg (153 lb)  Expected Total Weight Gain: 11.5 kg (25 lb)-16 kg (35 lb)   Pregravid BMI: 22.29  BP: 120/60    Fetal Heart Rate: +US     Physical Exam  Constitutional:       Appearance: Normal appearance.   HENT:      Head: Normocephalic and atraumatic.      Nose: Nose normal.     Eyes:      Extraocular Movements: Extraocular movements intact.      Pupils: Pupils are equal, round, and reactive to light.       Musculoskeletal:      Right lower leg: No edema.      Left lower leg: No edema.     Neurological:      General: No focal deficit present.      Mental Status: She is alert and oriented to person, place, and time.     Skin:     General: Skin is warm and dry.     Psychiatric:         Mood and Affect: Mood normal.         Behavior: Behavior normal.         Thought Content: Thought content normal.         Judgment: Judgment normal.         TVUS: +FHT, +FM, grossly normal fluid.    Assessment/Plan     Ucx and GC/CT done today.  Prenatal labs were ordered at last visit. Patient will get it done today.  Patient declined genetic screening.  Counseled about first trimester US. Ordered.  Recommended consulting PCP regarding her migraines. Advised to take Tylenol, Benadryl for sx control.  RTC in 4 weeks for routine prenatal visit.      Scribe Attestation  By signing my name below, IMaricruz, Screvy   attest that this documentation has been prepared under the direction and in the presence of Boubacar Pastor MD.

## 2025-08-08 ENCOUNTER — APPOINTMENT (OUTPATIENT)
Dept: LAB | Facility: HOSPITAL | Age: 30
End: 2025-08-08
Payer: COMMERCIAL

## 2025-08-08 ENCOUNTER — APPOINTMENT (OUTPATIENT)
Dept: OBSTETRICS AND GYNECOLOGY | Facility: CLINIC | Age: 30
End: 2025-08-08
Payer: COMMERCIAL

## 2025-08-08 VITALS — BODY MASS INDEX: 22.59 KG/M2 | WEIGHT: 153 LBS | DIASTOLIC BLOOD PRESSURE: 60 MMHG | SYSTOLIC BLOOD PRESSURE: 120 MMHG

## 2025-08-08 DIAGNOSIS — O34.219: ICD-10-CM

## 2025-08-08 DIAGNOSIS — Z3A.11 11 WEEKS GESTATION OF PREGNANCY (HHS-HCC): Primary | ICD-10-CM

## 2025-08-08 DIAGNOSIS — Z12.4 SCREENING FOR CERVICAL CANCER: ICD-10-CM

## 2025-08-08 PROBLEM — J32.9 CHRONIC SINUSITIS: Status: RESOLVED | Noted: 2023-10-03 | Resolved: 2025-08-08

## 2025-08-08 LAB
ABO GROUP (TYPE) IN BLOOD: NORMAL
ANTIBODY SCREEN: NORMAL
ERYTHROCYTE [DISTWIDTH] IN BLOOD BY AUTOMATED COUNT: 15.1 % (ref 11.5–14.5)
HCT VFR BLD AUTO: 37.9 % (ref 36–46)
HGB BLD-MCNC: 12.2 G/DL (ref 12–16)
MCH RBC QN AUTO: 26.8 PG (ref 26–34)
MCHC RBC AUTO-ENTMCNC: 32.2 G/DL (ref 32–36)
MCV RBC AUTO: 83 FL (ref 80–100)
NRBC BLD-RTO: 0 /100 WBCS (ref 0–0)
PLATELET # BLD AUTO: 249 X10*3/UL (ref 150–450)
POC BLOOD, URINE: NEGATIVE
POC GLUCOSE, URINE: NEGATIVE MG/DL
POC KETONES, URINE: NEGATIVE MG/DL
POC LEUKOCYTES, URINE: NEGATIVE
POC NITRITE,URINE: NEGATIVE
POC PROTEIN, URINE: NEGATIVE MG/DL
RBC # BLD AUTO: 4.55 X10*6/UL (ref 4–5.2)
RH FACTOR (ANTIGEN D): NORMAL
WBC # BLD AUTO: 8.8 X10*3/UL (ref 4.4–11.3)

## 2025-08-08 PROCEDURE — 85027 COMPLETE CBC AUTOMATED: CPT

## 2025-08-08 PROCEDURE — 86850 RBC ANTIBODY SCREEN: CPT

## 2025-08-08 PROCEDURE — 86901 BLOOD TYPING SEROLOGIC RH(D): CPT

## 2025-08-08 PROCEDURE — 86900 BLOOD TYPING SEROLOGIC ABO: CPT

## 2025-08-08 PROCEDURE — 0501F PRENATAL FLOW SHEET: CPT | Performed by: OBSTETRICS & GYNECOLOGY

## 2025-08-09 LAB
EST. AVERAGE GLUCOSE BLD GHB EST-MCNC: 108 MG/DL
EST. AVERAGE GLUCOSE BLD GHB EST-SCNC: 6 MMOL/L
HBA1C MFR BLD: 5.4 %
HBV SURFACE AG SERPL QL IA: NORMAL
HCV AB SERPL QL IA: NORMAL
HIV 1+2 AB+HIV1 P24 AG SERPL QL IA: NORMAL
HIV 1+2 AB+HIV1 P24 AG SERPL QL IA: NORMAL
REFLEX ADDED, ANEMIA PANEL: NORMAL
RUBV IGG SERPL IA-ACNC: NORMAL
T PALLIDUM AB SER QL IA: NORMAL

## 2025-08-10 LAB
BACTERIA UR CULT: ABNORMAL
C TRACH RRNA SPEC QL NAA+PROBE: NOT DETECTED
N GONORRHOEA RRNA SPEC QL NAA+PROBE: NOT DETECTED
QUEST GC CT AMPLIFIED (ALWAYS MESSAGE): NORMAL

## 2025-08-12 LAB
HBV SURFACE AG SERPL QL IA: NORMAL
HCV AB SERPL QL IA: NORMAL
HIV 1+2 AB+HIV1 P24 AG SERPL QL IA: NORMAL
HIV 1+2 AB+HIV1 P24 AG SERPL QL IA: NORMAL
RUBV IGG SERPL IA-ACNC: 1.83 INDEX
T PALLIDUM AB SER QL IA: NEGATIVE

## 2025-09-02 ENCOUNTER — APPOINTMENT (OUTPATIENT)
Dept: OBSTETRICS AND GYNECOLOGY | Facility: CLINIC | Age: 30
End: 2025-09-02
Payer: COMMERCIAL

## 2025-09-04 ENCOUNTER — APPOINTMENT (OUTPATIENT)
Dept: OBSTETRICS AND GYNECOLOGY | Facility: CLINIC | Age: 30
End: 2025-09-04
Payer: COMMERCIAL

## 2025-09-04 PROBLEM — G43.909 MIGRAINE HEADACHE: Status: RESOLVED | Noted: 2023-10-03 | Resolved: 2025-09-04

## 2025-09-04 PROBLEM — Z3A.15 15 WEEKS GESTATION OF PREGNANCY (HHS-HCC): Status: ACTIVE | Noted: 2025-08-08

## 2025-09-04 PROBLEM — Z98.891 HISTORY OF CESAREAN SECTION, LOW TRANSVERSE: Status: ACTIVE | Noted: 2025-09-04

## 2025-09-04 PROBLEM — O34.219: Status: RESOLVED | Noted: 2024-04-23 | Resolved: 2025-09-04

## 2025-09-30 ENCOUNTER — APPOINTMENT (OUTPATIENT)
Dept: OBSTETRICS AND GYNECOLOGY | Facility: CLINIC | Age: 30
End: 2025-09-30
Payer: COMMERCIAL